# Patient Record
Sex: MALE | Race: WHITE | NOT HISPANIC OR LATINO
[De-identification: names, ages, dates, MRNs, and addresses within clinical notes are randomized per-mention and may not be internally consistent; named-entity substitution may affect disease eponyms.]

---

## 2019-04-11 ENCOUNTER — RX RENEWAL (OUTPATIENT)
Age: 59
End: 2019-04-11

## 2019-04-11 PROBLEM — Z00.00 ENCOUNTER FOR PREVENTIVE HEALTH EXAMINATION: Status: ACTIVE | Noted: 2019-04-11

## 2019-08-02 ENCOUNTER — APPOINTMENT (OUTPATIENT)
Dept: GASTROENTEROLOGY | Facility: CLINIC | Age: 59
End: 2019-08-02

## 2019-08-12 ENCOUNTER — APPOINTMENT (OUTPATIENT)
Dept: GASTROENTEROLOGY | Facility: CLINIC | Age: 59
End: 2019-08-12
Payer: COMMERCIAL

## 2019-08-12 VITALS
TEMPERATURE: 97.5 F | WEIGHT: 191 LBS | BODY MASS INDEX: 25.87 KG/M2 | SYSTOLIC BLOOD PRESSURE: 115 MMHG | DIASTOLIC BLOOD PRESSURE: 71 MMHG | HEART RATE: 73 BPM | HEIGHT: 72 IN | OXYGEN SATURATION: 96 %

## 2019-08-12 DIAGNOSIS — K46.9 UNSPECIFIED ABDOMINAL HERNIA W/OUT OBSTRUCTION OR GANGRENE: ICD-10-CM

## 2019-08-12 DIAGNOSIS — R45.0 NERVOUSNESS: ICD-10-CM

## 2019-08-12 DIAGNOSIS — F10.21 ALCOHOL DEPENDENCE, IN REMISSION: ICD-10-CM

## 2019-08-12 DIAGNOSIS — Z80.0 FAMILY HISTORY OF MALIGNANT NEOPLASM OF DIGESTIVE ORGANS: ICD-10-CM

## 2019-08-12 DIAGNOSIS — Z78.9 OTHER SPECIFIED HEALTH STATUS: ICD-10-CM

## 2019-08-12 DIAGNOSIS — R63.0 ANOREXIA: ICD-10-CM

## 2019-08-12 DIAGNOSIS — Z80.1 FAMILY HISTORY OF MALIGNANT NEOPLASM OF TRACHEA, BRONCHUS AND LUNG: ICD-10-CM

## 2019-08-12 PROCEDURE — 99213 OFFICE O/P EST LOW 20 MIN: CPT

## 2019-08-12 RX ORDER — TAMSULOSIN HYDROCHLORIDE 0.4 MG/1
0.4 CAPSULE ORAL
Qty: 30 | Refills: 0 | Status: ACTIVE | COMMUNITY
Start: 2019-02-12

## 2019-08-12 RX ORDER — UMECLIDINIUM BROMIDE AND VILANTEROL TRIFENATATE 62.5; 25 UG/1; UG/1
62.5-25 POWDER RESPIRATORY (INHALATION)
Qty: 180 | Refills: 0 | Status: ACTIVE | COMMUNITY
Start: 2019-04-28

## 2019-08-12 RX ORDER — PREDNISONE 10 MG/1
10 TABLET ORAL
Qty: 14 | Refills: 0 | Status: ACTIVE | COMMUNITY
Start: 2019-02-19

## 2019-08-12 RX ORDER — ADALIMUMAB 40MG/0.8ML
40 KIT SUBCUTANEOUS
Qty: 6 | Refills: 0 | Status: ACTIVE | COMMUNITY
Start: 2019-03-28

## 2019-08-12 RX ORDER — ESCITALOPRAM OXALATE 20 MG/1
20 TABLET ORAL
Qty: 90 | Refills: 0 | Status: ACTIVE | COMMUNITY
Start: 2019-07-15

## 2019-08-12 RX ORDER — BROMPHENIRAMINE MALEATE, PSEUDOEPHEDRINE HYDROCHLORIDE, 2; 30; 10 MG/5ML; MG/5ML; MG/5ML
30-2-10 SYRUP ORAL
Qty: 240 | Refills: 0 | Status: ACTIVE | COMMUNITY
Start: 2019-02-19

## 2019-08-12 RX ORDER — POLYETHYLENE GLYCOL 3350, SODIUM SULFATE, SODIUM CHLORIDE, POTASSIUM CHLORIDE, ASCORBIC ACID, SODIUM ASCORBATE 7.5-2.691G
100 KIT ORAL
Qty: 1 | Refills: 0 | Status: ACTIVE | COMMUNITY
Start: 2019-08-12 | End: 1900-01-01

## 2019-08-12 RX ORDER — FENOFIBRIC ACID 135 MG/1
135 CAPSULE, DELAYED RELEASE ORAL
Qty: 90 | Refills: 0 | Status: ACTIVE | COMMUNITY
Start: 2019-04-10

## 2019-08-12 RX ORDER — CLONAZEPAM 0.5 MG/1
0.5 TABLET ORAL
Qty: 30 | Refills: 0 | Status: ACTIVE | COMMUNITY
Start: 2019-06-03

## 2019-08-12 RX ORDER — ESCITALOPRAM OXALATE 10 MG/1
10 TABLET ORAL
Qty: 30 | Refills: 0 | Status: ACTIVE | COMMUNITY
Start: 2019-07-01

## 2019-08-12 RX ORDER — IBUPROFEN 800 MG/1
800 TABLET, FILM COATED ORAL
Qty: 90 | Refills: 0 | Status: ACTIVE | COMMUNITY
Start: 2019-05-10

## 2019-08-12 RX ORDER — AZITHROMYCIN 250 MG/1
250 TABLET, FILM COATED ORAL
Qty: 6 | Refills: 0 | Status: ACTIVE | COMMUNITY
Start: 2019-02-19

## 2019-08-12 NOTE — ASSESSMENT
[FreeTextEntry1] : Weight Loss: The patient complains of weight loss and anorexia.  The patient admits to losing 13 pounds over the past 2 months.  He recently gained back 7 pounds.  He is feeling better from the anxiety secondary to Clonazepam and Lexapro but cannot exclude a GI malignancy.   I recommend an upper endoscopy and colonoscopy to assess the symptoms and assess for occult GI malignancy.  The patient was told of the risks and benefits of the procedure.  The patient was told of the risks of perforation, emergency surgery, bleeding, infections and missed lesions.  The patient agreed and will schedule for the procedure.  The patient is to be n.p.o. after midnight and bowel prep was given.  The patient is to return for the procedure. \par Hiatal Hernia:  The patient was advised to avoid late-night meals and dietary indiscretions.  The patient was advised to avoid fried and fatty foods.  The patient was advised to abide by an anti-GERD diet. The patient was given a pamphlet for anti-GERD.  The patient and I reviewed the anti-GERD diet at length. I recommend a trial of Pantoprazole 40 mg once a day for the Torres's esophagus.\par Gastritis: The patient has a history of gastritis. The patient is to avoid nonsteroidal anti-inflammatory drugs and aspirin. I recommend a trial of pantoprazole 40 mg once a day for the symptoms and Torres's esophagus. \par Torres`s Esophagus: The patient is diagnosed with Torres`s Esophagus. The patient has a history of Torres`s esophagus on prior upper endoscopy.  The patient and I had a long discussion regarding the risk of Torres`s esophagus progressing to esophageal cancer.  The patient was told of the importance of acid suppression. The patient was told of the importance of follow-up for repeat endoscopies to assess for dysplasia. The patient agrees and will follow up. I recommend a trial of Pantoprazole 40mg once a day for the Torres`s esophagus. I recommend a repeat upper endoscopy to reassess for Torres’s esophagus and dysplasia.  The patient agreed and will follow up for the procedure.\par Gastric AVMs: The patient had a prior upper endoscopy that revealed nonbleeding gastric AVMs.  The gastric AVMs were not cauterized at the time. The patient denies any bright red blood per rectum, melena or hematemesis.  I will try to obtain the blood work to assess for anemia.  The patient is currently guaiac-negative on physical exam. If bleeding occurs, the patient may require ablation of a gastric AVM`s. \par Diverticulosis: I recommend a high-fiber diet and avoid seeds. The patient is to consider a trial of Metamucil once a day for fiber supplementation. The patient is to also consider a trial of a probiotic such as Align once a day. \par Internal Hemorrhoids: The patient is to be started on a trial of Anusol H. C. suppositories one per rectum nightly and Anusol HC2 .5% cream apply to affected area twice a day p.r.n. hemorrhoidal bleeding or pain. \par History of Colonic Polyps: The patient has a history of colonic polyps.  I recommend a repeat colonoscopy to reassess for colonic polyps.  The patient agreed and will follow up for the procedure.\par Family History of Colonic Polyps: The patient has a history of colonic polyps.  I recommend a repeat colonoscopy  to reassess for colonic polyps.  The patient agreed and will follow up for the procedure. \par Family History of Colon Cancer: The patient has a family history of colon cancer.  I recommend a repeat colonoscopy  to reassess for colonic polyps.  The patient agreed and will follow up for the procedure. \par Upper Endoscopy and Colonoscopy: I recommend an upper endoscopy and colonoscopy to assess the symptoms and assess for occult GI malignancy.  The patient was told of the risks and benefits of the procedure.  The patient was told of the risks of perforation, emergency surgery, bleeding, infections and missed lesions.  The patient agreed and will schedule for the procedure.  The patient is to be n.p.o. after midnight and bowel prep was given.  The patient is to return for the procedure. \par Follow-up: The patient is to follow-up in the office in 4 weeks to reassess the symptoms. The patient was told to call the office if any further problems.\par \par \par

## 2019-08-12 NOTE — HISTORY OF PRESENT ILLNESS
[None] : had no significant interval events [Heartburn] : denies heartburn [Nausea] : denies nausea [Vomiting] : denies vomiting [Diarrhea] : denies diarrhea [Constipation] : denies constipation [Yellow Skin Or Eyes (Jaundice)] : denies jaundice [Abdominal Pain] : denies abdominal pain [Abdominal Swelling] : denies abdominal swelling [Rectal Pain] : denies rectal pain [Wt Gain ___ Lbs] : no recent weight gain [Wt Loss ___ Lbs] : recent [unfilled] ~Upound(s) weight loss [GERD] : no gastroesophageal reflux disease [Hiatus Hernia] : hiatus hernia [Peptic Ulcer Disease] : no peptic ulcer disease [Pancreatitis] : no pancreatitis [Cholelithiasis] : cholelithiasis [Kidney Stone] : no kidney stone [Inflammatory Bowel Disease] : no inflammatory bowel disease [Irritable Bowel Syndrome] : no irritable bowel syndrome [Diverticulitis] : no diverticulitis [Alcohol Abuse] : no alcohol abuse [Malignancy] : no malignancy [Abdominal Surgery] : no abdominal surgery [Appendectomy] : appendectomy [Cholecystectomy] : cholecystectomy [de-identified] : The patient denies any prior exposure to hepatitis A, B or C.  The patient denies any large doses of nonsteroidal anti-inflammatory drugs or acetaminophen.   The patient denies sharing needles, razors, nail clippers, nail files, scissors, et cetera.  The patient admits to EtOH abuse but denies any recent ETOH use, cocaine use and intravenous drug use.  The patient denies any prior blood transfusions, sexual indiscretions, tattoos or piercing.  The patient admits to having prior surgery.  The history of surgery is significant for a prior cholecystectomy, vasectomy, testicle removal, hernia repair, appendectomy and undescended testicle.  The patient admits to a family history of GI and liver problems.  The patent’s mother had a history of diverticulitis and paternal grandmother with a history of colon cancer.  The patient states that he is feeling anxious.  The patient is being followed by his PMD, Dr. Keller.  The patient recently retired from work.   The patient lost 13 pounds over 2 months secondary to anxiety and loss of appetite.  The patient had a recent CAT scan of the chest to assess lung nodule but the results are pending.  The patient denies any jaundice or pruritus.  The patient denies any chronic lower back pain. The patient denies any abdominal pain.  The patient denies any abdominal gas and bloating.  The patient denies any nausea or vomiting.  The patient denies any gastroesophageal reflux disease or dysphagia.  The patient denies any atypical chest pain, shortness of breath or palpitations.  The patient admits to occasional episodes of diaphoresis.  .  The patient denies any constipation or diarrhea.  The patient has 1 to 2 bowel movements a day.   The patient denies a change in bowel habits.  The patient denies a change in caliber of stool. The patient denies having mucus discharge with the bowel movements.  The patient denies any bright red blood per rectum, melena or hematemesis.  The patient denies any rectal pain or rectal pruritus.  The patient complains of weight loss and anorexia.  The patient admits to losing 13 pounds over the past 2 months.  He recently gained back 7 pounds.  He is feeling better from the anxiety secondary to Clonazepam and Lexapro.  He denies any fevers or chills.  The patient is being followed by his pulmonologist, Dr. Savage. The CAT scan of the chest performed on August 9, 2019 is pending.  The upper endoscopy performed at the office on June 22, 2018 revealed Torres esophagus, a hiatal hernia, a small nonbleeding gastric AVM in the hiatal hernia sac and moderate diffuse bile gastritis.  The pathology revealed distal esophagus with squamous mucosa with mild gastroesophageal reflux disease with no evidence of eosinophilic esophagitis or fungal microorganisms, Torres's mucosa with chronic inflammation with no evidence of epithelial dysplasia that was negative for Helicobacter pylori, gastric antral mucosa with chronic inactive gastritis with no evidence of intestinal metaplasia or dysplasia that was negative for Helicobacter pylori, gastric body mucosa with chronic inactive gastritis with no evidence of intestinal metaplasia or dysplasia that was negative for Helicobacter pylori and duodenal mucosa with normal villous architecture with no evidence of intraepithelial lymphocytosis, celiac disease, Giardia or parasites. The colonoscopy to the terminal ileum performed at the office on November 10, 2017 revealed a rectal sigmoid colon polypoid lesion versus inverted diverticula versus pseudopolyp, moderate left-sided diverticulosis and small internal hemorrhoids. There were no other polyps, masses, AVMs or colitis noted. Random biopsies were taken of the rectosigmoid colon polypoid lesion versus an everted diverticula versus pseudopolyp (30 cm) to assess for microscopic colitis. The pathology revealed fragments of almost completely denuded polypoid granulation tissue covered with fibrino-purulent exudate and fragments of colonic mucosa with focal hyperplasia and changes suggestive of inflammatory bowel formation with no dysplasia, granulomas or viral inclusions identified. The findings favor diverticulosis associated changes and inflammatory polyp formation. The patient tolerated the procedures well. The patient was previously evaluated by his PMD for abdominal pain in March 2018 and treated with antibiotics for presumed acute diverticulitis with improvement of his symptoms.  The patient had been on Humira every 2 weeks for psoriasis. The CAT scan of the abdomen and pelvis with IV contrast performed on June 26, 2013 revealed extensive colonic diverticulosis, minimal haziness in the peritoneal fat adjacent to the distal and mid descending colon that might indicate minimal diverticulitis.  Also noted were a few small calcifications in the inferior pancreatic head consistent with old/chronic pancreatitis. The patient admits to a prior history of EtOH abuse. The patient admits to being abstinent of alcohol for 9 years. The patient admits to a family history of colon cancer and diverticulitis.

## 2019-08-13 LAB — HEMOCCULT STL QL: NEGATIVE

## 2019-09-13 ENCOUNTER — RX RENEWAL (OUTPATIENT)
Age: 59
End: 2019-09-13

## 2019-09-13 RX ORDER — PANTOPRAZOLE 40 MG/1
40 TABLET, DELAYED RELEASE ORAL DAILY
Qty: 30 | Refills: 3 | Status: ACTIVE | COMMUNITY
Start: 2019-04-11 | End: 1900-01-01

## 2019-09-16 ENCOUNTER — RX RENEWAL (OUTPATIENT)
Age: 59
End: 2019-09-16

## 2019-09-16 RX ORDER — PANTOPRAZOLE 40 MG/1
40 TABLET, DELAYED RELEASE ORAL DAILY
Qty: 30 | Refills: 3 | Status: ACTIVE | COMMUNITY
Start: 2019-09-16 | End: 1900-01-01

## 2019-09-24 ENCOUNTER — APPOINTMENT (OUTPATIENT)
Dept: GASTROENTEROLOGY | Facility: HOSPITAL | Age: 59
End: 2019-09-24

## 2019-09-24 ENCOUNTER — OUTPATIENT (OUTPATIENT)
Dept: OUTPATIENT SERVICES | Facility: HOSPITAL | Age: 59
LOS: 1 days | End: 2019-09-24
Payer: COMMERCIAL

## 2019-09-24 DIAGNOSIS — K57.30 DIVERTICULOSIS OF LARGE INTESTINE WITHOUT PERFORATION OR ABSCESS WITHOUT BLEEDING: ICD-10-CM

## 2019-09-24 PROCEDURE — 45378 DIAGNOSTIC COLONOSCOPY: CPT

## 2019-09-24 PROCEDURE — G0105: CPT

## 2019-11-15 ENCOUNTER — APPOINTMENT (OUTPATIENT)
Dept: GASTROENTEROLOGY | Facility: CLINIC | Age: 59
End: 2019-11-15
Payer: COMMERCIAL

## 2019-11-15 VITALS
TEMPERATURE: 97.7 F | SYSTOLIC BLOOD PRESSURE: 105 MMHG | DIASTOLIC BLOOD PRESSURE: 71 MMHG | WEIGHT: 183 LBS | HEART RATE: 72 BPM | HEIGHT: 72 IN | BODY MASS INDEX: 24.79 KG/M2 | OXYGEN SATURATION: 94 %

## 2019-11-15 DIAGNOSIS — K57.30 DIVERTICULOSIS OF LARGE INTESTINE W/OUT PERFORATION OR ABSCESS W/OUT BLEEDING: ICD-10-CM

## 2019-11-15 PROCEDURE — 99213 OFFICE O/P EST LOW 20 MIN: CPT

## 2019-11-15 RX ORDER — PANTOPRAZOLE 40 MG/1
40 TABLET, DELAYED RELEASE ORAL
Qty: 90 | Refills: 3 | Status: ACTIVE | COMMUNITY
Start: 2019-11-15 | End: 1900-01-01

## 2019-11-15 RX ORDER — ALBUTEROL SULFATE 90 UG/1
108 (90 BASE) INHALANT RESPIRATORY (INHALATION)
Qty: 8 | Refills: 0 | Status: ACTIVE | COMMUNITY
Start: 2019-10-24

## 2019-11-15 NOTE — ASSESSMENT
[FreeTextEntry1] : Weight Loss: The patient previously complained of weight loss and anorexia. The patient admitted to losing 13 pounds over  2 months. The weight has stabilized with no further weight loss. The anxiety improved on Clonazepam and Lexapro. I recommend an upper endoscopy  to assess the symptoms and assess for occult GI malignancy. The patient was told of the risks and benefits of the procedure. The patient was told of the risks of perforation, emergency surgery, bleeding, infections and missed lesions. The patient agreed and will schedule for the procedure. The patient is to be n.p.o. after midnight . The patient is to return for the procedure. \par Hiatal Hernia: The patient was advised to avoid late-night meals and dietary indiscretions. The patient was advised to avoid fried and fatty foods. The patient was advised to abide by an anti-GERD diet. The patient was given a pamphlet for anti-GERD. The patient and I reviewed the anti-GERD diet at length. I recommend a trial of Pantoprazole 40 mg once a day for the Torres's esophagus.\par Gastritis: The patient has a history of gastritis. The patient is to avoid nonsteroidal anti-inflammatory drugs and aspirin. I recommend a trial of pantoprazole 40 mg once a day for the symptoms and Torres's esophagus. \par Torres’s Esophagus: The patient is diagnosed with Torres’s Esophagus. The patient has a history of Torres’s esophagus on prior upper endoscopy. The patient and I had a long discussion regarding the risk of Torres’s esophagus progressing to esophageal cancer. The patient was told of the importance of acid suppression. The patient was told of the importance of follow-up for repeat endoscopies to assess for dysplasia. The patient agrees and will follow up. I recommend a trial of Pantoprazole 40mg once a day for the Torres’s esophagus. I recommend a repeat upper endoscopy to reassess for Torres’s esophagus and dysplasia. The patient agreed and will follow up for the procedure.\par Gastric AVMs: The patient had a prior upper endoscopy that revealed nonbleeding gastric AVMs. The gastric AVMs were not cauterized at the time. The patient denies any bright red blood per rectum, melena or hematemesis. I will try to obtain the blood work to assess for anemia. The patient is currently guaiac-negative on physical exam. If bleeding occurs, the patient may require ablation of a gastric AVM\par Diverticulosis: I recommend a high-fiber diet and avoid seeds. The patient is to consider a trial of Metamucil once a day for fiber supplementation. The patient is to also consider a trial of a probiotic such as Align once a day. \par Internal Hemorrhoids: The patient is to be started on a trial of Anusol H. C. suppositories one per rectum nightly and Anusol HC2.5% cream apply to affected area twice a day p.r.n. hemorrhoidal bleeding or pain. \par History of Colonic Polyps: The patient has a history of colonic polyps. I recommend a repeat colonoscopy to reassess for colonic polyps. The patient agreed and will follow up for the procedure.\par Family History of Colonic Polyps: The patient has a history of colonic polyps. I recommend a repeat colonoscopy in 5 years to reassess for colonic polyps unless symptomatic. The patient agreed and will follow up for the procedure. \par Family History of Colon Cancer: The patient has a family history of colon cancer. I recommend a repeat colonoscopy in 5 years to reassess for colonic polyps unless symptomatic. The patient agreed and will follow up for the procedure. \par Follow-up: The patient is to follow-up in the office in 6 months to reassess the symptoms. The patient was told to call the office if any further problems.\par

## 2019-11-15 NOTE — HISTORY OF PRESENT ILLNESS
[None] : had no significant interval events [Nausea] : denies nausea [Heartburn] : denies heartburn [Vomiting] : denies vomiting [Diarrhea] : denies diarrhea [Constipation] : denies constipation [Yellow Skin Or Eyes (Jaundice)] : denies jaundice [Abdominal Pain] : denies abdominal pain [Abdominal Swelling] : denies abdominal swelling [Rectal Pain] : denies rectal pain [Cholelithiasis] : cholelithiasis [Appendectomy] : appendectomy [Cholecystectomy] : cholecystectomy [Wt Gain ___ Lbs] : no recent weight gain [Wt Loss ___ Lbs] : no recent weight loss [GERD] : no gastroesophageal reflux disease [Hiatus Hernia] : no hiatus hernia [Peptic Ulcer Disease] : no peptic ulcer disease [Pancreatitis] : no pancreatitis [Kidney Stone] : no kidney stone [Inflammatory Bowel Disease] : no inflammatory bowel disease [Irritable Bowel Syndrome] : no irritable bowel syndrome [Alcohol Abuse] : no alcohol abuse [Diverticulitis] : no diverticulitis [Malignancy] : no malignancy [de-identified] : The patient denies any prior exposure to hepatitis A, B or C. The patient denies any large doses of nonsteroidal anti-inflammatory drugs or acetaminophen. The patient denies sharing needles, razors, nail clippers, nail files, scissors, et cetera. The patient admits to EtOH abuse but denies any recent ETOH use, cocaine use and intravenous drug use. The patient denies any prior blood transfusions, sexual indiscretions, tattoos or piercing. The patient admits to having prior surgery. The history of surgery is significant for a prior cholecystectomy, vasectomy, testicle removal, hernia repair, appendectomy and undescended testicle. The patient admits to a family history of GI and liver problems. The patent’s mother had a history of diverticulitis and paternal grandmother with a history of colon cancer. The patient states that he is feeling fine. The patient denies any jaundice or pruritus.  The patient denies any chronic lower back pain. The patient denies any abdominal pain.  The patient denies any abdominal gas and bloating.  The patient denies any nausea or vomiting.  The patient denies any gastroesophageal reflux disease or dysphagia.  The patient denies any atypical chest pain, shortness of breath or palpitations. The patient admits to occasional episodes of diaphoresis.  The patient denies any constipation or diarrhea.  The patient has 2 bowel movements a day.  The patient denies a change in bowel habits.  The patient denies a change in caliber of stool.  The patient denies having mucus discharge with the bowel movements.  The patient denies any bright red blood per rectum, melena or hematemesis.  The patient denies any rectal pain or rectal pruritus.  The patient denies any weight loss or anorexia.  He denies any fevers or chills.  The patient had a colonoscopy to the terminal ileum performed at the [facility] endoscopy suite on September 24, 2019. The colonoscopy to the terminal ileum revealed moderate pandiverticulosis that was primarily concentrated to the left colon and small internal hemorrhoids.  There were no polyps, masses, AVMs or colitis noted.  The upper endoscopy performed at the office on June 22, 2018 revealed Torres esophagus, a hiatal hernia, a small nonbleeding gastric AVM in the hiatal hernia sac and moderate diffuse bile gastritis. The pathology revealed distal esophagus with squamous mucosa with mild gastroesophageal reflux disease with no evidence of eosinophilic esophagitis or fungal microorganisms, Torres's mucosa with chronic inflammation with no evidence of epithelial dysplasia that was negative for Helicobacter pylori, gastric antral mucosa with chronic inactive gastritis with no evidence of intestinal metaplasia or dysplasia that was negative for Helicobacter pylori, gastric body mucosa with chronic inactive gastritis with no evidence of intestinal metaplasia or dysplasia that was negative for Helicobacter pylori and duodenal mucosa with normal villous architecture with no evidence of intraepithelial lymphocytosis, celiac disease, Giardia or parasites.  The patient tolerated the procedures well. The patient is being followed by his pulmonologist, Dr. Savage. The CAT scan of the chest performed on August 9, 2019 is pending. The patient was previously evaluated by his PMD for abdominal pain in March 2018 and treated with antibiotics for presumed acute diverticulitis with improvement of his symptoms. The patient had been on Humira every 2 weeks for psoriasis. The CAT scan of the abdomen and pelvis with IV contrast performed on June 26, 2013 revealed extensive colonic diverticulosis, minimal haziness in the peritoneal fat adjacent to the distal and mid descending colon that might indicate minimal diverticulitis. Also noted were a few small calcifications in the inferior pancreatic head consistent with old/chronic pancreatitis. The patient admits to a prior history of EtOH abuse. The patient admits to being abstinent of alcohol for 9 years. The patient admits to a family history of colon cancer and diverticulitis.  [Abdominal Surgery] : no abdominal surgery

## 2020-01-09 ENCOUNTER — RESULT REVIEW (OUTPATIENT)
Age: 60
End: 2020-01-09

## 2020-01-09 ENCOUNTER — APPOINTMENT (OUTPATIENT)
Dept: GASTROENTEROLOGY | Facility: HOSPITAL | Age: 60
End: 2020-01-09

## 2020-01-09 ENCOUNTER — OUTPATIENT (OUTPATIENT)
Dept: OUTPATIENT SERVICES | Facility: HOSPITAL | Age: 60
LOS: 1 days | End: 2020-01-09
Payer: COMMERCIAL

## 2020-01-09 DIAGNOSIS — K29.30 CHRONIC SUPERFICIAL GASTRITIS WITHOUT BLEEDING: ICD-10-CM

## 2020-01-09 PROCEDURE — 88312 SPECIAL STAINS GROUP 1: CPT | Mod: 26

## 2020-01-09 PROCEDURE — 88305 TISSUE EXAM BY PATHOLOGIST: CPT | Mod: 26

## 2020-01-09 PROCEDURE — 43239 EGD BIOPSY SINGLE/MULTIPLE: CPT

## 2020-01-09 PROCEDURE — 88312 SPECIAL STAINS GROUP 1: CPT

## 2020-01-09 PROCEDURE — 88305 TISSUE EXAM BY PATHOLOGIST: CPT

## 2020-01-13 LAB — SURGICAL PATHOLOGY STUDY: SIGNIFICANT CHANGE UP

## 2020-02-28 ENCOUNTER — APPOINTMENT (OUTPATIENT)
Dept: GASTROENTEROLOGY | Facility: CLINIC | Age: 60
End: 2020-02-28
Payer: COMMERCIAL

## 2020-02-28 VITALS
SYSTOLIC BLOOD PRESSURE: 125 MMHG | OXYGEN SATURATION: 96 % | TEMPERATURE: 97.6 F | HEIGHT: 72 IN | WEIGHT: 179 LBS | DIASTOLIC BLOOD PRESSURE: 74 MMHG | BODY MASS INDEX: 24.24 KG/M2 | HEART RATE: 88 BPM

## 2020-02-28 PROCEDURE — 99213 OFFICE O/P EST LOW 20 MIN: CPT

## 2020-02-28 RX ORDER — LEVOFLOXACIN 500 MG/1
500 TABLET, FILM COATED ORAL
Qty: 7 | Refills: 0 | Status: ACTIVE | COMMUNITY
Start: 2019-11-26

## 2020-02-28 RX ORDER — PANTOPRAZOLE 40 MG/1
40 TABLET, DELAYED RELEASE ORAL
Qty: 90 | Refills: 3 | Status: ACTIVE | COMMUNITY
Start: 2020-02-28 | End: 1900-01-01

## 2020-02-28 NOTE — ASSESSMENT
[FreeTextEntry1] : Weight Loss: The patient previously complained of weight loss and anorexia. The patient admitted to losing 13 pounds over 2 months. The weight has stabilized with no further weight loss. The anxiety improved on Clonazepam and Lexapro. \par Hiatal Hernia: The patient was advised to avoid late-night meals and dietary indiscretions. The patient was advised to avoid fried and fatty foods. The patient was advised to abide by an anti-GERD diet. The patient was given a pamphlet for anti-GERD. The patient and I reviewed the anti-GERD diet at length. I recommend a trial of Pantoprazole 40 mg once a day for the Torres's esophagus.\par Gastritis: The patient has a history of gastritis. The patient is to avoid nonsteroidal anti-inflammatory drugs and aspirin. I recommend a trial of pantoprazole 40 mg once a day for the symptoms and Torres's esophagus. \par Torres’s Esophagus: The patient has a history of Torres’s esophagus on recent upper endoscopy. The patient and I had a long discussion regarding the risk of Torres’s esophagus progressing to esophageal cancer. The patient was told of the importance of acid suppression. The patient was told of the importance of follow-up for repeat endoscopies to assess for dysplasia. The patient agrees and will follow up. I recommend a trial of Pantoprazole 40mg once a day for the Torres’s esophagus. I recommend a repeat upper endoscopy in 3 years to reassess for Torres’s esophagus and dysplasia unless symptomatic. The patient agreed and will follow up for the procedure.\par Gastric AVMs: The patient had a prior upper endoscopy that revealed nonbleeding gastric AVMs. The gastric AVMs were not cauterized at the time. The patient denies any bright red blood per rectum, melena or hematemesis. I will try to obtain the blood work to assess for anemia. The patient is currently guaiac-negative on physical exam. If bleeding occurs, the patient may require ablation of a gastric AVM\par Diverticulosis: I recommend a high-fiber diet and avoid seeds. The patient is to consider a trial of Metamucil once a day for fiber supplementation. The patient is to also consider a trial of a probiotic such as Align once a day. \par Internal Hemorrhoids: The patient is to be started on a trial of Anusol H. C. suppositories one per rectum nightly and Anusol HC2.5% cream apply to affected area twice a day p.r.n. hemorrhoidal bleeding or pain. \par History of Colonic Polyps: The patient has a history of colonic polyps. I recommend a repeat colonoscopy in 5 years to reassess for colonic polyps unless symptomatic. The patient agreed and will follow up for the procedure.\par Family History of Colonic Polyps: The patient has a history of colonic polyps. I recommend a repeat colonoscopy in 5 years to reassess for colonic polyps unless symptomatic. The patient agreed and will follow up for the procedure. \par Family History of Colon Cancer: The patient has a family history of colon cancer. I recommend a repeat colonoscopy in 5 years to reassess for colonic polyps unless symptomatic. The patient agreed and will follow up for the procedure. \par Follow-up: The patient is to follow-up in the office in 6 months to reassess the symptoms. The patient was told to call the office if any further problems.\par

## 2020-02-28 NOTE — HISTORY OF PRESENT ILLNESS
[None] : had no significant interval events [Heartburn] : denies heartburn [Nausea] : denies nausea [Vomiting] : denies vomiting [Diarrhea] : denies diarrhea [Constipation] : denies constipation [Abdominal Pain] : denies abdominal pain [Abdominal Swelling] : denies abdominal swelling [Yellow Skin Or Eyes (Jaundice)] : denies jaundice [Rectal Pain] : denies rectal pain [Hiatus Hernia] : hiatus hernia [Cholelithiasis] : cholelithiasis [Appendectomy] : appendectomy [Cholecystectomy] : cholecystectomy [Wt Gain ___ Lbs] : no recent weight gain [GERD] : no gastroesophageal reflux disease [Wt Loss ___ Lbs] : no recent weight loss [Peptic Ulcer Disease] : no peptic ulcer disease [Pancreatitis] : no pancreatitis [Inflammatory Bowel Disease] : no inflammatory bowel disease [Kidney Stone] : no kidney stone [Irritable Bowel Syndrome] : no irritable bowel syndrome [Alcohol Abuse] : no alcohol abuse [Diverticulitis] : no diverticulitis [Malignancy] : no malignancy [Abdominal Surgery] : no abdominal surgery [de-identified] : The patient denies any prior exposure to hepatitis A, B or C. The patient denies any large doses of nonsteroidal anti-inflammatory drugs or acetaminophen. The patient denies sharing needles, razors, nail clippers, nail files, scissors, et cetera. The patient admits to EtOH abuse but denies any recent ETOH use, cocaine use and intravenous drug use. The patient denies any prior blood transfusions, sexual indiscretions, tattoos or piercing. The patient admits to having prior surgery. The history of surgery is significant for a prior cholecystectomy, vasectomy, testicle removal, hernia repair, appendectomy and undescended testicle. The patient admits to a family history of GI and liver problems. The patent’s mother had a history of diverticulitis and paternal grandmother with a history of colon cancer.  The patient states that he is feeling fine. The patient denies any jaundice or pruritus.  The patient denies any chronic lower back pain. The patient denies any abdominal pain.  The patient denies any abdominal gas and bloating.  The patient denies any nausea or vomiting.  The patient denies any gastroesophageal reflux disease or dysphagia.  The patient denies any atypical chest pain, shortness of breath or palpitations.  The patient denies any diaphoresis. The patient denies any constipation or diarrhea.  The patient has 1 to 2 bowel movements a day.  The patient denies a change in bowel habits.  The patient denies a change in caliber of stool.  The patient denies having mucus discharge with the bowel movements.  The patient denies any bright red blood per rectum, melena or hematemesis. The patient denies any rectal pain or rectal pruritus.  The patient currently denies any weight loss or anorexia.  He denies any fevers or chills.  The patient had an upper endoscopy performed at the [facility] [place] GI endoscopy suite on January 9, 2020. The upper endoscopy was performed up to the level of the second portion of the duodenum. The upper endoscopy revealed Torres's esophagus, a large hiatal hernia and mild diffuse bile gastritis. Biopsies were taken of the distal esophagus, antrum, body of stomach and duodenum to assess for esophagitis, gastritis and duodenitis. The pathology revealed distal esophagus with squamo-glandular junction composed of unremarkable squamous epithelium and inflamed fragments of cardiac-type mucosa with foci of intestinal metaplasia with no dysplasia identified, gastric antral and body mucosa with chronic inactive gastritis that was negative for Helicobacter pylori and unremarkable small intestinal duodenal mucosa with preserved villous architecture with no evidence of increased intraepithelial lymphocytes, celiac disease, or parasites.   The patient had a colonoscopy to the terminal ileum performed at the [facility] [place] GI endoscopy suite on September 24, 2019. The colonoscopy to the terminal ileum revealed moderate pandiverticulosis that was primarily concentrated to the left colon and small internal hemorrhoids. There were no polyps, masses, AVMs or colitis noted. The patient tolerated the procedures well. The patient is being followed by his pulmonologist, Dr. Savage. The CAT scan of the chest performed on August 9, 2019 is pending. The patient was previously evaluated by his PMD for abdominal pain in March 2018 and treated with antibiotics for presumed acute diverticulitis with improvement of his symptoms. The patient had been on Humira every 2 weeks for psoriasis. The CAT scan of the abdomen and pelvis with IV contrast performed on June 26, 2013 revealed extensive colonic diverticulosis, minimal haziness in the peritoneal fat adjacent to the distal and mid descending colon that might indicate minimal diverticulitis. Also noted were a few small calcifications in the inferior pancreatic head consistent with old/chronic pancreatitis. The patient admits to a prior history of EtOH abuse. The patient admits to being abstinent of alcohol for 9 years. The patient admits to a family history of colon cancer and diverticulitis.

## 2020-09-11 ENCOUNTER — APPOINTMENT (OUTPATIENT)
Dept: GASTROENTEROLOGY | Facility: CLINIC | Age: 60
End: 2020-09-11
Payer: COMMERCIAL

## 2020-09-11 VITALS
OXYGEN SATURATION: 98 % | DIASTOLIC BLOOD PRESSURE: 73 MMHG | SYSTOLIC BLOOD PRESSURE: 117 MMHG | HEIGHT: 72 IN | BODY MASS INDEX: 24.38 KG/M2 | WEIGHT: 180 LBS | HEART RATE: 89 BPM

## 2020-09-11 DIAGNOSIS — K62.5 HEMORRHAGE OF ANUS AND RECTUM: ICD-10-CM

## 2020-09-11 PROCEDURE — 99213 OFFICE O/P EST LOW 20 MIN: CPT

## 2020-09-11 NOTE — ASSESSMENT
[FreeTextEntry1] : Dyspepsia: The patient complains of dyspeptic symptoms.  The patient was advised to abide by an anti-gas (low FOD-MAP) diet.  The patient was given a pamphlet for anti-gas.  The patient and I reviewed the anti-gas diet at length. The patient is to start on a trial of Phazyme one tablet 3 times a day p.r.n. abdominal pain and gas.\par Rectal Bleeding: The patient had episodes of rectal bleeding.  The etiology of the rectal bleeding is unclear.  I recommend a trial of Anusol HC suppositories one per rectum QHS and Anusol HC 2.5% cream apply to affected area twice a day PRN hemorrhoidal bleeding or pain.  I recommend a trial of Calmoseptine cream apply to affected area twice a day for rectal discomfort. I recommend Tucks pads for the hemorrhoids.  I recommend starting Sitz baths twice a day for the hemorrhoids.  I recommend avoid wearing tight underwear and use boxers. I recommend avoid touching the perineal area. If the symptoms persist, I recommend a surgical evaluation for possible band ligation of the hemorrhoids with Dr. Davis Hua for possible surgery.  If the symptoms persist, the patient may require a colonoscopy to assess the site of bleeding. The patient was told of the risks and benefits of the procedure.  The patient was told of the risks of perforation, emergency surgery, bleeding, infections and missed lesions. The patient agrees and will follow up to assess the symptoms.\par History of Weight Loss: The patient previously complained of weight loss and anorexia. The patient admitted to losing 13 pounds over 2 months. The weight has stabilized with no further weight loss. The anxiety improved on Clonazepam and Lexapro. \par Hiatal Hernia: The patient was advised to avoid late-night meals and dietary indiscretions. The patient was advised to avoid fried and fatty foods. The patient was advised to abide by an anti-GERD diet. The patient was given a pamphlet for anti-GERD. The patient and I reviewed the anti-GERD diet at length. I recommend a trial of Pantoprazole 40 mg once a day for the Torres's esophagus.\par Gastritis: The patient has a history of gastritis. The patient is to avoid nonsteroidal anti-inflammatory drugs and aspirin. I recommend a trial of pantoprazole 40 mg once a day for the symptoms and Torres's esophagus. \par Torres’s Esophagus: The patient has a history of Torres’s esophagus on recent upper endoscopy. The patient and I had a long discussion regarding the risk of Torres’s esophagus progressing to esophageal cancer. The patient was told of the importance of acid suppression. The patient was told of the importance of follow-up for repeat endoscopies to assess for dysplasia. The patient agrees and will follow up. I recommend a trial of Pantoprazole 40mg once a day for the Torres’s esophagus. I recommend a repeat upper endoscopy in 2 years to reassess for Torres’s esophagus and dysplasia unless symptomatic. The patient agreed and will follow up for the procedure.\par Gastric AVMs: The patient had a prior upper endoscopy that revealed nonbleeding gastric AVMs. The gastric AVMs were not cauterized at the time. The patient denies any bright red blood per rectum, melena or hematemesis. I will try to obtain the blood work to assess for anemia. The patient is currently guaiac-negative on physical exam. If bleeding occurs, the patient may require ablation of a gastric AVM\par Diverticulosis: I recommend a high-fiber diet and avoid seeds. The patient is to consider a trial of Metamucil once a day for fiber supplementation. The patient is to also consider a trial of a probiotic such as Align once a day. \par Internal Hemorrhoids: The patient is to be started on a trial of Anusol H. C. suppositories one per rectum nightly and Anusol HC2.5% cream apply to affected area twice a day p.r.n. hemorrhoidal bleeding or pain. \par History of Colonic Polyps: The patient has a history of colonic polyps. I recommend a repeat colonoscopy in 5 years to reassess for colonic polyps unless symptomatic. The patient agreed and will follow up for the procedure.\par Family History of Colonic Polyps: The patient has a history of colonic polyps. I recommend a repeat colonoscopy in 4 years to reassess for colonic polyps unless symptomatic. The patient agreed and will follow up for the procedure. \par Family History of Colon Cancer: The patient has a family history of colon cancer. I recommend a repeat colonoscopy in 4 years to reassess for colonic polyps unless symptomatic. The patient agreed and will follow up for the procedure. \par Follow-up: The patient is to follow-up in the office in 6 months to reassess the symptoms. The patient was told to call the office if any further problems.\par \par

## 2020-09-11 NOTE — HISTORY OF PRESENT ILLNESS
[None] : had no significant interval events [Heartburn] : resolved heartburn [Nausea] : denies nausea [Vomiting] : denies vomiting [Diarrhea] : resolved diarrhea [Constipation] : denies constipation [Yellow Skin Or Eyes (Jaundice)] : denies jaundice [Rectal Pain] : denies rectal pain [Abdominal Pain] : denies abdominal pain [Abdominal Swelling] : abdominal swelling [Hiatus Hernia] : hiatus hernia [Cholelithiasis] : cholelithiasis [Appendectomy] : appendectomy [Cholecystectomy] : cholecystectomy [Wt Gain ___ Lbs] : no recent weight gain [Wt Loss ___ Lbs] : no recent weight loss [GERD] : no gastroesophageal reflux disease [Peptic Ulcer Disease] : no peptic ulcer disease [Pancreatitis] : no pancreatitis [Kidney Stone] : no kidney stone [Inflammatory Bowel Disease] : no inflammatory bowel disease [Irritable Bowel Syndrome] : no irritable bowel syndrome [Diverticulitis] : no diverticulitis [Alcohol Abuse] : no alcohol abuse [Malignancy] : no malignancy [Abdominal Surgery] : no abdominal surgery [de-identified] : The patient denies any prior exposure to hepatitis A, B or C. The patient denies any large doses of nonsteroidal anti-inflammatory drugs or acetaminophen. The patient denies sharing needles, razors, nail clippers, nail files, scissors, et cetera. The patient admits to EtOH abuse but denies any recent ETOH use, cocaine use and intravenous drug use. The patient denies any prior blood transfusions, sexual indiscretions, tattoos or piercing. The patient admits to having prior surgery. The history of surgery is significant for a prior cholecystectomy, vasectomy, testicle removal, hernia repair, appendectomy and undescended testicle. The patient admits to a family history of GI and liver problems. The patent’s mother had a history of diverticulitis and paternal grandmother with a history of colon cancer. The patient is currently being evaluated by new PMD, Dr. Anahi Mishra DO.  The patient states that he is feeling better. The patient denies any jaundice or pruritus.  The patient denies any chronic lower back pain. The patient denies any abdominal pain.  The patient complains of abdominal gas and bloating.  He attributed the symptoms after starting Prolistic (probiotics).  The patient denies any nausea or vomiting.  The patient denies any gastroesophageal reflux disease or dysphagia.  The patient denies any atypical chest pain, shortness of breath or palpitations.  The patient admits to occasional episodes of diaphoresis at night.  The patient denies any constipation or diarrhea.  The patient has 1 to 2 bowel movements a day.  The patient denies a change in bowel habits.  The patient denies a change in caliber of stool.  The patient denies having mucus discharge with the bowel movements.  The patient complains of 1 episode of lower GI bleeding that resolved spontaneously.  He currently denies any bright red blood per rectum, melena or hematemesis.  The patient denies any rectal pain or rectal pruritus.  The patient complains of fluctuating weight but denies any anorexia.  The patient admits to losing 5 pounds over the past 4 weeks/months.  He denies any fevers or chills.  The patient had an upper endoscopy performed at the [facility] [place] GI endoscopy suite on January 9, 2020. The upper endoscopy was performed up to the level of the second portion of the duodenum. The upper endoscopy revealed Torres's esophagus, a large hiatal hernia and mild diffuse bile gastritis. Biopsies were taken of the distal esophagus, antrum, body of stomach and duodenum to assess for esophagitis, gastritis and duodenitis. The pathology revealed distal esophagus with squamo-glandular junction composed of unremarkable squamous epithelium and inflamed fragments of cardiac-type mucosa with foci of intestinal metaplasia with no dysplasia identified, gastric antral and body mucosa with chronic inactive gastritis that was negative for Helicobacter pylori and unremarkable small intestinal duodenal mucosa with preserved villous architecture with no evidence of increased intraepithelial lymphocytes, celiac disease, or parasites. The patient had a colonoscopy to the terminal ileum performed at the [facility] GI endoscopy suite on September 24, 2019. The colonoscopy to the terminal ileum revealed moderate pandiverticulosis that was primarily concentrated to the left colon and small internal hemorrhoids. There were no polyps, masses, AVMs or colitis noted. The patient tolerated the procedures well. The patient is being followed by his pulmonologist, Dr. Savage. The CAT scan of the chest performed on August 9, 2019 is pending. The patient was previously evaluated by his PMD for abdominal pain in March 2018 and treated with antibiotics for presumed acute diverticulitis with improvement of his symptoms. The patient had been on Humira every 2 weeks for psoriasis. The CAT scan of the abdomen and pelvis with IV contrast performed on June 26, 2013 revealed extensive colonic diverticulosis, minimal haziness in the peritoneal fat adjacent to the distal and mid descending colon that might indicate minimal diverticulitis. Also noted were a few small calcifications in the inferior pancreatic head consistent with old/chronic pancreatitis. The patient admits to a prior history of EtOH abuse. The patient admits to being abstinent of alcohol for 9 years. The patient admits to a family history of colon cancer and diverticulitis.  [de-identified] : (+) prior smoking 2PPD x 10 years stopped x 18 years, (-) ETOH stopped x 10 years, (-) IVDA\par

## 2021-04-16 ENCOUNTER — APPOINTMENT (OUTPATIENT)
Dept: GASTROENTEROLOGY | Facility: CLINIC | Age: 61
End: 2021-04-16
Payer: COMMERCIAL

## 2021-04-16 VITALS
DIASTOLIC BLOOD PRESSURE: 82 MMHG | HEIGHT: 72 IN | SYSTOLIC BLOOD PRESSURE: 136 MMHG | OXYGEN SATURATION: 97 % | BODY MASS INDEX: 23.57 KG/M2 | HEART RATE: 104 BPM | TEMPERATURE: 97.2 F | WEIGHT: 174 LBS

## 2021-04-16 DIAGNOSIS — R63.4 ABNORMAL WEIGHT LOSS: ICD-10-CM

## 2021-04-16 PROCEDURE — 99072 ADDL SUPL MATRL&STAF TM PHE: CPT

## 2021-04-16 PROCEDURE — 99214 OFFICE O/P EST MOD 30 MIN: CPT

## 2021-04-16 NOTE — ASSESSMENT
[FreeTextEntry1] : GERD: The patient was advised to avoid late-night meals and dietary indiscretions.  The patient was advised to avoid fried and fatty foods.  The patient was advised to abide by an anti-GERD diet. The patient was previously given a pamphlet for anti-GERD.  The patient and I reviewed the anti-GERD diet at length. I recommend a trial of Pantoprazole 40 mg once a day  for the symptoms.\par History of Weight Loss: The patient complains of weight loss but denies any anorexia. The patient admitted to losing 7 to 10 pounds over 4 weeks. The anxiety improved on smoking Marijuana, Clonazepam and Lexapro. \par Hiatal Hernia: The patient was advised to avoid late-night meals and dietary indiscretions. The patient was advised to avoid fried and fatty foods. The patient was advised to abide by an anti-GERD diet. The patient was given a pamphlet for anti-GERD. The patient and I reviewed the anti-GERD diet at length. I recommend a trial of Pantoprazole 40 mg once a day for the Torres's esophagus.\par Gastritis: The patient has a history of gastritis. The patient is to avoid nonsteroidal anti-inflammatory drugs and aspirin. I recommend a trial of pantoprazole 40 mg once a day for the symptoms and Torres's esophagus. \par Torres’s Esophagus: The patient has a history of Torres’s esophagus on recent upper endoscopy. The patient and I had a long discussion regarding the risk of Torres’s esophagus progressing to esophageal cancer. The patient was told of the importance of acid suppression. The patient was told of the importance of follow-up for repeat endoscopies to assess for dysplasia. The patient agrees and will follow up. I recommend a trial of Pantoprazole 40mg once a day for the Torres’s esophagus. I recommend a repeat upper endoscopy in 2 years to reassess for Torres’s esophagus and dysplasia unless symptomatic. The patient agreed and will follow up for the procedure.\par Gastric AVMs: The patient had a prior upper endoscopy that revealed nonbleeding gastric AVMs. The gastric AVMs were not cauterized at the time. The patient denies any bright red blood per rectum, melena or hematemesis. If bleeding occurs, the patient may require ablation of a gastric AVM\par Diverticulosis: I recommend a high-fiber diet and avoid seeds. The patient is to consider a trial of Metamucil once a day for fiber supplementation. The patient is to also consider a trial of a probiotic such as Align once a day. \par Internal Hemorrhoids: The patient is to consider a trial of Anusol H. C. suppositories one per rectum nightly and Anusol HC2.5% cream apply to affected area twice a day p.r.n. hemorrhoidal bleeding or pain. \par History of Colonic Polyps: The patient has a history of colonic polyps. I recommend a repeat colonoscopy in 3 years to reassess for colonic polyps unless symptomatic. The patient agreed and will follow up for the procedure.\par Family History of Colonic Polyps: The patient has a history of colonic polyps. I recommend a repeat colonoscopy in 3 years to reassess for colonic polyps unless symptomatic. The patient agreed and will follow up for the procedure. \par Family History of Colon Cancer: The patient has a family history of colon cancer. I recommend a repeat colonoscopy in 3 years to reassess for colonic polyps unless symptomatic. The patient agreed and will follow up for the procedure. \par The patient was again advised to followup with his pulmonologist, Dr. Hayes for the lung nodule.  He is aware of the risk of lung cancer and prior exposure to asbestos and knows the importance of followup.\par Follow-up: The patient is to follow-up in the office in 6 months to reassess the symptoms. The patient was told to call the office if any further problems.\par \par \par

## 2021-04-16 NOTE — HISTORY OF PRESENT ILLNESS
[None] : had no significant interval events [Nausea] : denies nausea [Vomiting] : denies vomiting [Diarrhea] : denies diarrhea [Constipation] : denies constipation [Yellow Skin Or Eyes (Jaundice)] : denies jaundice [Abdominal Pain] : denies abdominal pain [Abdominal Swelling] : denies abdominal swelling [Rectal Pain] : denies rectal pain [Wt Loss ___ Lbs] : recent [unfilled] ~Upound(s) weight loss [Heartburn] : heartburn [Wt Gain ___ Lbs] : no recent weight gain [GERD] : no gastroesophageal reflux disease [Hiatus Hernia] : no hiatus hernia [Peptic Ulcer Disease] : no peptic ulcer disease [Pancreatitis] : no pancreatitis [Cholelithiasis] : no cholelithiasis [Kidney Stone] : no kidney stone [Inflammatory Bowel Disease] : no inflammatory bowel disease [Irritable Bowel Syndrome] : no irritable bowel syndrome [Diverticulitis] : no diverticulitis [Alcohol Abuse] : no alcohol abuse [Malignancy] : no malignancy [Abdominal Surgery] : no abdominal surgery [Appendectomy] : no appendectomy [Cholecystectomy] : no cholecystectomy [de-identified] : The patient denies any prior exposure to hepatitis A, B or C. The patient denies any large doses of nonsteroidal anti-inflammatory drugs or acetaminophen. The patient denies sharing needles, razors, nail clippers, nail files, scissors, et cetera. The patient admits to prior EtOH abuse but denies any recent ETOH use, cocaine use and intravenous drug use. The patient denies any prior blood transfusions, sexual indiscretions, tattoos or piercing. The patient admits to having prior surgery. The history of surgery is significant for a prior cholecystectomy, vasectomy, testicle removal, hernia repair, appendectomy and undescended testicle. The patient admits to a family history of GI and liver problems. The patent’s mother had a history of diverticulitis and paternal grandmother with a history of colon cancer.  The patient states that he is feeling better.   The patient is being followed by Dr. Parsons for new onset pneumonia.  The patient’s recent Chest X-ray and told of lung nodule.  The patient was evaluated at [facility] with a CAT scan of the chest.  The patient is being followed by Pulmonologist, Dr. Hayes.  The patient admits to being exposed to asbestos from [employer]. The patient denies any jaundice or pruritus.  The patient denies any chronic lower back pain. The patient denies any abdominal pain.  The patient denies any abdominal gas and bloating.  The patient denies any nausea or vomiting.  The patient complains of gastroesophageal reflux disease  but denies any dysphagia.  The gastroesophageal reflux disease is worse after meals and late at night and in the early morning. The gastroesophageal reflux disease is improved with proton pump inhibitors, H2 blockers and antacids.    The patient complains of shortness of breath but denies any atypical chest pain or palpitations.  The patient denies any diaphoresis. The patient denies any constipation or diarrhea.  The patient has 1 to 2 bowel movements a day.  The patient denies a change in bowel habits.  The patient denies a change in caliber of stool.  The patient denies having mucus discharge with the bowel movements.  The patient denies any bright red blood per rectum, melena or hematemesis.  The patient denies any rectal pain or rectal pruritus.  The patient complains of weight loss but denies any anorexia.  The patient admits to losing 7 to 10 pounds over the past 4 weeks.  He denies any fevers or chills.  The patient had an upper endoscopy performed at the [facility] GI endoscopy suite on January 9, 2020. The upper endoscopy was performed up to the level of the second portion of the duodenum. The upper endoscopy revealed Torres's esophagus, a large hiatal hernia and mild diffuse bile gastritis. Biopsies were taken of the distal esophagus, antrum, body of stomach and duodenum to assess for esophagitis, gastritis and duodenitis. The pathology revealed distal esophagus with squamo-glandular junction composed of unremarkable squamous epithelium and inflamed fragments of cardiac-type mucosa with foci of intestinal metaplasia with no dysplasia identified, gastric antral and body mucosa with chronic inactive gastritis that was negative for Helicobacter pylori and unremarkable small intestinal duodenal mucosa with preserved villous architecture with no evidence of increased intraepithelial lymphocytes, celiac disease, or parasites. The patient had a colonoscopy to the terminal ileum performed at the [facility] GI endoscopy suite on September 24, 2019. The colonoscopy to the terminal ileum revealed moderate pandiverticulosis that was primarily concentrated to the left colon and small internal hemorrhoids. There were no polyps, masses, AVMs or colitis noted. The patient tolerated the procedures well. The patient is being followed by his pulmonologist, Dr. Savage. The CAT scan of the chest performed on August 9, 2019 is pending. The patient was previously evaluated by his PMD for abdominal pain in March 2018 and treated with antibiotics for presumed acute diverticulitis with improvement of his symptoms. The patient had been on Humira every 2 weeks for psoriasis. The CAT scan of the abdomen and pelvis with IV contrast performed on June 26, 2013 revealed extensive colonic diverticulosis, minimal haziness in the peritoneal fat adjacent to the distal and mid descending colon that might indicate minimal diverticulitis. Also noted were a few small calcifications in the inferior pancreatic head consistent with old/chronic pancreatitis. The patient admits to a prior history of EtOH abuse. The patient admits to being abstinent of alcohol for 9 years. The patient admits to a family history of colon cancer and diverticulitis.  [de-identified] : (+) prior smoking 1 to 2 PPD x 10 years, stopped x 20 years, (+) prior ETOH stopped x 10 years, (-) IVDA, (+) current smoking marijuana for anxiety\par

## 2021-10-15 ENCOUNTER — APPOINTMENT (OUTPATIENT)
Dept: GASTROENTEROLOGY | Facility: CLINIC | Age: 61
End: 2021-10-15

## 2021-10-25 RX ORDER — PANTOPRAZOLE 40 MG/1
40 TABLET, DELAYED RELEASE ORAL DAILY
Qty: 30 | Refills: 3 | Status: ACTIVE | COMMUNITY
Start: 2021-04-16 | End: 1900-01-01

## 2021-10-27 ENCOUNTER — RX RENEWAL (OUTPATIENT)
Age: 61
End: 2021-10-27

## 2021-12-13 ENCOUNTER — APPOINTMENT (OUTPATIENT)
Dept: GASTROENTEROLOGY | Facility: CLINIC | Age: 61
End: 2021-12-13
Payer: COMMERCIAL

## 2021-12-13 VITALS
OXYGEN SATURATION: 96 % | HEIGHT: 72 IN | HEART RATE: 103 BPM | BODY MASS INDEX: 24.79 KG/M2 | SYSTOLIC BLOOD PRESSURE: 124 MMHG | TEMPERATURE: 97.4 F | WEIGHT: 183 LBS | DIASTOLIC BLOOD PRESSURE: 77 MMHG

## 2021-12-13 DIAGNOSIS — K29.30 CHRONIC SUPERFICIAL GASTRITIS W/OUT BLEEDING: ICD-10-CM

## 2021-12-13 PROCEDURE — 99213 OFFICE O/P EST LOW 20 MIN: CPT

## 2021-12-13 RX ORDER — CYCLOBENZAPRINE HYDROCHLORIDE 10 MG/1
10 TABLET, FILM COATED ORAL
Refills: 0 | Status: ACTIVE | COMMUNITY

## 2021-12-13 RX ORDER — PREDNISONE 10 MG
TABLET ORAL
Refills: 0 | Status: ACTIVE | COMMUNITY

## 2021-12-13 RX ORDER — PANTOPRAZOLE 40 MG/1
40 TABLET, DELAYED RELEASE ORAL DAILY
Qty: 30 | Refills: 3 | Status: ACTIVE | COMMUNITY
Start: 2021-12-13 | End: 1900-01-01

## 2021-12-13 NOTE — HISTORY OF PRESENT ILLNESS
[None] : had no significant interval events [Nausea] : denies nausea [Vomiting] : denies vomiting [Diarrhea] : denies diarrhea [Constipation] : denies constipation [Yellow Skin Or Eyes (Jaundice)] : denies jaundice [Abdominal Pain] : denies abdominal pain [Abdominal Swelling] : denies abdominal swelling [Rectal Pain] : denies rectal pain [Wt Gain ___ Lbs] : recent [unfilled] ~Upound(s) weight gain [Heartburn] : heartburn [GERD] : gastroesophageal reflux disease [Wt Loss ___ Lbs] : no recent weight loss [Hiatus Hernia] : no hiatus hernia [Peptic Ulcer Disease] : no peptic ulcer disease [Pancreatitis] : no pancreatitis [Cholelithiasis] : no cholelithiasis [Kidney Stone] : no kidney stone [Inflammatory Bowel Disease] : no inflammatory bowel disease [Irritable Bowel Syndrome] : no irritable bowel syndrome [Diverticulitis] : no diverticulitis [Alcohol Abuse] : no alcohol abuse [Malignancy] : no malignancy [Abdominal Surgery] : no abdominal surgery [Appendectomy] : no appendectomy [Cholecystectomy] : no cholecystectomy [de-identified] : The patient denies any prior exposure to hepatitis A, B or C. The patient denies any large doses of nonsteroidal anti-inflammatory drugs or acetaminophen. The patient denies sharing needles, razors, nail clippers, nail files, scissors, et cetera. The patient admits to prior EtOH abuse but denies any recent ETOH use, cocaine use and intravenous drug use. The patient denies any prior blood transfusions, sexual indiscretions, tattoos or piercing. The patient admits to having prior surgery. The history of surgery is significant for a prior cholecystectomy, vasectomy, testicle removal, hernia repair, appendectomy and undescended testicle. The patient admits to a family history of GI and liver problems. The patent’s mother had a history of diverticulitis and paternal grandmother with a history of colon cancer. The patient states that he is feeling better. The patient denies any jaundice or pruritus.  The patient complains of chronic lower back pain. The patient denies any abdominal pain.  The patient denies any abdominal gas and bloating.  The patient denies any nausea or vomiting.  The patient complains of gastroesophageal reflux disease but denies any dysphagia.  The gastroesophageal reflux disease is worse after meals and late at night and in the early morning. The gastroesophageal reflux disease is improved with proton pump inhibitors, H2 blockers and antacids.   The patient denies any atypical chest pain, shortness of breath or palpitations.  The patient admits to occasional episodes of diaphoresis.  The patient denies any constipation or diarrhea.  The patient has 2 to 3 bowel movements a day.  The patient denies a change in bowel habits.  The patient denies a change in caliber of stool.  The patient denies having mucus discharge with the bowel movements.  The patient denies any bright red blood per rectum, melena or hematemesis.  The patient denies any rectal pain or rectal pruritus.  The patient denies any weight loss or anorexia.  The patient admits to gaining 5 to 6 pounds over the past few weeks. The patient previously complained of weight loss but denied any anorexia. The patient admitted to losing 7 to 10 pounds over 4 weeks. He denies any fevers or chills. The patient had an upper endoscopy performed at the [facility] GI endoscopy suite on January 9, 2020. The upper endoscopy was performed up to the level of the second portion of the duodenum. The upper endoscopy revealed Torres's esophagus, a large hiatal hernia and mild diffuse bile gastritis. Biopsies were taken of the distal esophagus, antrum, body of stomach and duodenum to assess for esophagitis, gastritis and duodenitis. The pathology revealed distal esophagus with squamo-glandular junction composed of unremarkable squamous epithelium and inflamed fragments of cardiac-type mucosa with foci of intestinal metaplasia with no dysplasia identified, gastric antral and body mucosa with chronic inactive gastritis that was negative for Helicobacter pylori and unremarkable small intestinal duodenal mucosa with preserved villous architecture with no evidence of increased intraepithelial lymphocytes, celiac disease, or parasites. The patient had a colonoscopy to the terminal ileum performed at the [facility] GI endoscopy suite on September 24, 2019. The colonoscopy to the terminal ileum revealed moderate pandiverticulosis that was primarily concentrated to the left colon and small internal hemorrhoids. There were no polyps, masses, AVMs or colitis noted. The patient tolerated the procedures well. The patient is being followed by Dr. Parsons for new onset pneumonia. The patient’s recent Chest X-ray and told of lung nodule. The patient was evaluated at [facility] with a CAT scan of the chest. The patient is being followed by Pulmonologist, Dr. Hayes. The patient admits to being exposed to asbestos from [employer] employment. The patient was previously evaluated by his PMD for abdominal pain in March 2018 and treated with antibiotics for presumed acute diverticulitis with improvement of his symptoms. The patient had been on Humira every 2 weeks for psoriasis. The CAT scan of the abdomen and pelvis with IV contrast performed on June 26, 2013 revealed extensive colonic diverticulosis, minimal haziness in the peritoneal fat adjacent to the distal and mid descending colon that might indicate minimal diverticulitis. Also noted were a few small calcifications in the inferior pancreatic head consistent with old/chronic pancreatitis. The patient admits to a prior history of EtOH abuse. The patient admits to being abstinent of alcohol for 9 years. The patient admits to a family history of colon cancer and diverticulitis.  [de-identified] : (+) prior smoking 1 to 2 PPD x 10 years, stopped x 20 years, (+) prior ETOH stopped x 10 years, (-) IVDA, (+) current smoking marijuana for anxiety\par

## 2021-12-13 NOTE — ASSESSMENT
[FreeTextEntry1] : GERD: The patient was advised to avoid late-night meals and dietary indiscretions.  The patient was advised to avoid fried and fatty foods.  The patient was advised to abide by an anti-GERD diet. The patient was given a pamphlet for anti-GERD.  The patient and I reviewed the anti-GERD diet at length. I recommend a trial of Pantoprazole 40 mg once a day x 3 months for the symptoms.\par History of Weight Loss: The patient previously complained of weight loss but denied any anorexia. The patient admitted to losing 7 to 10 pounds over 4 weeks. The anxiety improved on smoking Marijuana, Clonazepam and Lexapro. The patient has recently gained weight.\par Hiatal Hernia: The patient was advised to avoid late-night meals and dietary indiscretions. The patient was advised to avoid fried and fatty foods. The patient was advised to abide by an anti-GERD diet. The patient was given a pamphlet for anti-GERD. The patient and I reviewed the anti-GERD diet at length. I recommend a trial of Pantoprazole 40 mg once a day for the Torres's esophagus.\par Gastritis: The patient has a history of gastritis. The patient is to avoid nonsteroidal anti-inflammatory drugs and aspirin. I recommend a trial of pantoprazole 40 mg once a day for the symptoms and Torres's esophagus. \par Torres’s Esophagus: The patient has a history of Torres’s esophagus on recent upper endoscopy. The patient and I had a long discussion regarding the risk of Torres’s esophagus progressing to esophageal cancer. The patient was told of the importance of acid suppression. The patient was told of the importance of follow-up for repeat endoscopies to assess for dysplasia. The patient agrees and will follow up. I recommend a trial of Pantoprazole 40 mg once a day for the Torres’s esophagus. I recommend a repeat upper endoscopy in 2 years to reassess for Torres’s esophagus and dysplasia unless symptomatic. The patient agreed and will follow up for the procedure.\par Gastric AVMs: The patient had a prior upper endoscopy that revealed nonbleeding gastric AVMs. The gastric AVMs were not cauterized at the time. The patient denies any bright red blood per rectum, melena or hematemesis. If bleeding occurs, the patient may require ablation of a gastric AVM\par Diverticulosis: I recommend a high-fiber diet and avoid seeds. The patient is to consider a trial of Metamucil once a day for fiber supplementation. The patient is to also consider a trial of a probiotic such as Align once a day. \par Internal Hemorrhoids: The patient is to consider a trial of Anusol H. C. suppositories one per rectum nightly and Anusol HC2.5% cream apply to affected area twice a day p.r.n. hemorrhoidal bleeding or pain. \par History of Colonic Polyps: The patient has a history of colonic polyps. I recommend a repeat colonoscopy in 3 years to reassess for colonic polyps unless symptomatic. The patient agreed and will follow up for the procedure.\par Family History of Colonic Polyps: The patient has a history of colonic polyps. I recommend a repeat colonoscopy in 3 years to reassess for colonic polyps unless symptomatic. The patient agreed and will follow up for the procedure. \par Family History of Colon Cancer: The patient has a family history of colon cancer. I recommend a repeat colonoscopy in 3 years to reassess for colonic polyps unless symptomatic. The patient agreed and will follow up for the procedure. \par The patient was again advised to followup with his pulmonologist, Dr. Hayes for the lung nodule. He is aware of the risk of lung cancer and prior exposure to asbestos and knows the importance of followup.\par Follow-up: The patient is to follow-up in the office in 6 months to reassess the symptoms. The patient was told to call the office if any further problems.\par \par

## 2022-02-15 ENCOUNTER — NON-APPOINTMENT (OUTPATIENT)
Age: 62
End: 2022-02-15

## 2022-02-16 ENCOUNTER — NON-APPOINTMENT (OUTPATIENT)
Age: 62
End: 2022-02-16

## 2022-02-23 ENCOUNTER — APPOINTMENT (OUTPATIENT)
Dept: GASTROENTEROLOGY | Facility: CLINIC | Age: 62
End: 2022-02-23
Payer: COMMERCIAL

## 2022-02-23 VITALS
DIASTOLIC BLOOD PRESSURE: 68 MMHG | HEIGHT: 72 IN | WEIGHT: 183 LBS | OXYGEN SATURATION: 97 % | TEMPERATURE: 97.9 F | SYSTOLIC BLOOD PRESSURE: 111 MMHG | HEART RATE: 80 BPM | BODY MASS INDEX: 24.79 KG/M2

## 2022-02-23 PROCEDURE — 99214 OFFICE O/P EST MOD 30 MIN: CPT

## 2022-02-23 RX ORDER — SIMETHICONE 180 MG
180 CAPSULE ORAL 4 TIMES DAILY
Qty: 120 | Refills: 3 | Status: ACTIVE | COMMUNITY
Start: 2022-02-23 | End: 1900-01-01

## 2022-02-23 RX ORDER — PANTOPRAZOLE 40 MG/1
40 TABLET, DELAYED RELEASE ORAL DAILY
Qty: 30 | Refills: 3 | Status: ACTIVE | COMMUNITY
Start: 2022-02-23 | End: 1900-01-01

## 2022-02-23 RX ORDER — BIFIDOBACTERIUM LONGUM 10MM CELL
4 CAPSULE ORAL
Qty: 30 | Refills: 3 | Status: ACTIVE | COMMUNITY
Start: 2022-02-23 | End: 1900-01-01

## 2022-02-23 NOTE — HISTORY OF PRESENT ILLNESS
[None] : had no significant interval events [Heartburn] : denies heartburn [Nausea] : resolved nausea [Vomiting] : denies vomiting [Diarrhea] : resolved diarrhea [Constipation] : denies constipation [Yellow Skin Or Eyes (Jaundice)] : denies jaundice [Abdominal Pain] : resolved abdominal pain [Abdominal Swelling] : abdominal swelling resolved [Rectal Pain] : denies rectal pain [Wt Gain ___ Lbs] : no recent weight gain [Wt Loss ___ Lbs] : no recent weight loss [GERD] : no gastroesophageal reflux disease [Hiatus Hernia] : no hiatus hernia [Peptic Ulcer Disease] : no peptic ulcer disease [Pancreatitis] : no pancreatitis [Cholelithiasis] : no cholelithiasis [Kidney Stone] : no kidney stone [Inflammatory Bowel Disease] : no inflammatory bowel disease [Irritable Bowel Syndrome] : no irritable bowel syndrome [Diverticulitis] : no diverticulitis [Alcohol Abuse] : no alcohol abuse [Malignancy] : no malignancy [Abdominal Surgery] : no abdominal surgery [Appendectomy] : no appendectomy [Cholecystectomy] : no cholecystectomy [de-identified] : The patient denies any prior exposure to hepatitis A, B or C. The patient denies any large doses of nonsteroidal anti-inflammatory drugs or acetaminophen. The patient denies sharing needles, razors, nail clippers, nail files, scissors, et cetera. The patient admits to prior EtOH abuse but denies any recent ETOH use, cocaine use and intravenous drug use. The patient denies any prior blood transfusions, sexual indiscretions, tattoos or piercing. The patient admits to having prior surgery. The history of surgery is significant for a prior cholecystectomy, vasectomy, testicle removal, hernia repair, appendectomy and undescended testicle. The patient admits to a family history of GI and liver problems. The patent’s mother had a history of diverticulitis and paternal grandmother with a history of colon cancer. The patient states that he is feeling better.  The patient states that he had a bout of diverticulitis. The patient was evaluated at an urgent care center and was treated with metronidazole and ciprofloxacin for possible acute diverticulitis. The patient had just had a CAT scan of the abdomen pelvis with IV contrast to assess the symptoms. The CAT scan of the abdomen and pelvis with IV contrast performed on February 15, 2022 revealed diverticulosis distal descending colon and sigmoid colon without evidence of acute diverticulitis. There are calcifications in the head of the pancreas occasionally in the uncinate process without change which may be due to chronic pancreatitis. The patient states that he is feeling better with resolution of the abdominal pain and nausea. The patient denies any jaundice or pruritus.  The patient denies any chronic lower back pain. The patient previously complained of abdominal pain.  The patient describes the abdominal pain as a crampy, burning, intermittent left lower quadrant discomfort that is nonradiating in nature.  The abdominal pain is worse with meals and improves with passing gas or having a bowel movement.  The abdominal pain is described as mild to moderate in nature.  The abdominal pain occurs at night and in the morning.  The abdominal pain can occur at any time.   The abdominal pain has never awakened the patient from sleep.  The abdominal pain is relieved with certain medication such as antibiotics.  The abdominal pain was associated with abdominal gas and bloating.  The patient currently denies any abdominal gas and bloating.  The patient previously complained of nausea that resolved spontaneously.  He currently denies any nausea or vomiting.  The patient denies any gastroesophageal reflux disease or dysphagia.  The patient complains of dyspnea upon exertion but denies any atypical chest pain or palpitations.  The patient is being followed by his pulmonologist, Dr. Hayes for his shortness of breath and lung nodule.  The patient currently denies any diaphoresis. The patient previously admitted to episodes of diaphoresis.  The patient previously complained of diarrhea that resolved with antibiotic treatment.  The patient remembers eating food (almonds) prior to the symptoms.  No other friends or family had been ill with similar complaints.  The patient denies any recent travel.  The patient admits to a similar episode in the past that resolved spontaneously.  The patient denies taking antibiotics prior to the symptoms.   The patient currently denies any constipation or diarrhea.  The patient has 1 to 3 bowel movements a day. The diarrhea was described as soft to watery in nature.   The patient complains of a change in bowel habits.  The patient complains of a change in caliber of stool.   The patient denies having mucus discharge with the bowel movements.  The patient denies any bright red blood per rectum, melena or hematemesis.  The patient denies any rectal pain or rectal pruritus.  The patient currently denies any weight loss or anorexia.  He currently denies any fevers or chills.  The patient had an upper endoscopy performed at the [place] GI endoscopy suite on January 9, 2020. The upper endoscopy was performed up to the level of the second portion of the duodenum. The upper endoscopy revealed Torres's esophagus, a large hiatal hernia and mild diffuse bile gastritis. Biopsies were taken of the distal esophagus, antrum, body of stomach and duodenum to assess for esophagitis, gastritis and duodenitis. The pathology revealed distal esophagus with squamo-glandular junction composed of unremarkable squamous epithelium and inflamed fragments of cardiac-type mucosa with foci of intestinal metaplasia with no dysplasia identified, gastric antral and body mucosa with chronic inactive gastritis that was negative for Helicobacter pylori and unremarkable small intestinal duodenal mucosa with preserved villous architecture with no evidence of increased intraepithelial lymphocytes, celiac disease, or parasites. The patient had a colonoscopy to the terminal ileum performed at the [place] GI endoscopy suite on September 24, 2019. The colonoscopy to the terminal ileum revealed moderate pandiverticulosis that was primarily concentrated to the left colon and small internal hemorrhoids. There were no polyps, masses, AVMs or colitis noted. The patient tolerated the procedures well. The patient is being followed by Dr. Parsons for new onset pneumonia. The patient’s recent Chest X-ray and told of lung nodule. The patient was evaluated at [facility] with a CAT scan of the chest. The patient is being followed by Pulmonologist, Dr. Hayes. The patient admits to being exposed to asbestos from [employer] employment. The patient was previously evaluated by his PMD for abdominal pain in March 2018 and treated with antibiotics for presumed acute diverticulitis with improvement of his symptoms. The patient had been on Humira every 2 weeks for psoriasis. The CAT scan of the abdomen and pelvis with IV contrast performed on June 26, 2013 revealed extensive colonic diverticulosis, minimal haziness in the peritoneal fat adjacent to the distal and mid descending colon that might indicate minimal diverticulitis. Also noted were a few small calcifications in the inferior pancreatic head consistent with old/chronic pancreatitis. The patient admits to a prior history of EtOH abuse. The patient admits to being abstinent of alcohol for 9 years. The patient admits to a family history of colon cancer and diverticulitis.  [de-identified] : (+) prior smoking 1 to 2 PPD x 10 years, stopped x 20 years, (+) prior ETOH stopped x 10 years, (-) IVDA, (+) current smoking marijuana for anxiety\par \par

## 2022-02-23 NOTE — ASSESSMENT
[FreeTextEntry1] : Abdominal Pain: The patient complains of abdominal pain. The patient is to avoid nonsteroidal anti-inflammatory drugs and aspirin.  I recommend a trial of Phazyme 1 tablet PO 3 times a day for 3 months for the symptoms.\par Diarrhea: The patient complains of diarrhea.  I recommend a low residue diet. The patient is to avoid fiber supplementation. The patient is to consider starting a trial of a probiotic such as Align once a day.   If the symptoms recur, the patient may require sending stool studies for C+S, O+P x3, and C. difficile to assess for an infectious etiology of the diarrhea.   If the symptoms persist, the patient may require a colonoscopy to assess for colitis versus other causes.  The patient was told of the risks and benefits of the procedure.  The patient was told of the risks of perforation, emergency surgery, bleeding, infections and missed lesions.  The patient agreed and will follow-up to reassess the symptoms.  \par Urgent Care Evaluation: The patient states that he had a bout of diverticulitis. The patient was evaluated at an urgent care center and was treated with metronidazole and ciprofloxacin for possible acute diverticulitis. The patient had just had a CAT scan of the abdomen pelvis with IV contrast to assess the symptoms. The CAT scan of the abdomen and pelvis with IV contrast performed on February 15, 2022 revealed diverticulosis distal descending colon and sigmoid colon without evidence of acute diverticulitis. There are calcifications in the head of the pancreas occasionally in the uncinate process without change which may be due to chronic pancreatitis. The patient states that he is starting to feel better with resolution of the abdominal pain but still feels nauseous. \par History of Weight Loss: The patient previously complained of weight loss but denied any anorexia. The patient admitted to losing 7 to 10 pounds over 4 weeks. The anxiety improved on smoking Marijuana, Clonazepam and Lexapro. The patient has recently gained weight.\par Hiatal Hernia: The patient was advised to avoid late-night meals and dietary indiscretions. The patient was advised to avoid fried and fatty foods. The patient was advised to abide by an anti-GERD diet. The patient was given a pamphlet for anti-GERD. The patient and I reviewed the anti-GERD diet at length. I recommend a trial of Pantoprazole 40 mg once a day for the Torres's esophagus.\par Gastritis: The patient has a history of gastritis. The patient is to avoid nonsteroidal anti-inflammatory drugs and aspirin. I recommend a trial of pantoprazole 40 mg once a day for the symptoms and Torres's esophagus. \par Torres’s Esophagus: The patient has a history of Torres’s esophagus on recent upper endoscopy. The patient and I had a long discussion regarding the risk of Torres’s esophagus progressing to esophageal cancer. The patient was told of the importance of acid suppression. The patient was told of the importance of follow-up for repeat endoscopies to assess for dysplasia. The patient agrees and will follow up. I recommend a trial of Pantoprazole 40 mg once a day for the Torres’s esophagus. I recommend a repeat upper endoscopy in 2 years to reassess for Torres’s esophagus and dysplasia unless symptomatic. The patient agreed and will follow up for the procedure.\par Gastric AVMs: The patient had a prior upper endoscopy that revealed nonbleeding gastric AVMs. The gastric AVMs were not cauterized at the time. The patient denies any bright red blood per rectum, melena or hematemesis. If bleeding occurs, the patient may require ablation of a gastric AVM\par Diverticulosis: I recommend a high-fiber diet and avoid seeds. The patient is to consider a trial of Metamucil once a day for fiber supplementation. The patient is to also consider a trial of a probiotic such as Align once a day. \par Internal Hemorrhoids: The patient is to consider a trial of Anusol H. C. suppositories one per rectum nightly and Anusol HC2.5% cream apply to affected area twice a day p.r.n. hemorrhoidal bleeding or pain. \par History of Colonic Polyps: The patient has a history of colonic polyps. I recommend a repeat colonoscopy in 3 years to reassess for colonic polyps unless symptomatic. The patient agreed and will follow up for the procedure.\par Family History of Colonic Polyps: The patient has a history of colonic polyps. I recommend a repeat colonoscopy in 3 years to reassess for colonic polyps unless symptomatic. The patient agreed and will follow up for the procedure. \par Family History of Colon Cancer: The patient has a family history of colon cancer. I recommend a repeat colonoscopy in 3 years to reassess for colonic polyps unless symptomatic. The patient agreed and will follow up for the procedure. \par The patient was again advised to followup with his pulmonologist, Dr. Hayes for the lung nodule. He is aware of the risk of lung cancer and prior exposure to asbestos and knows the importance of followup.\par Follow-up: The patient is to follow-up in the office in 6 months to reassess the symptoms. The patient was told to call the office if any further problems.\par \par \par

## 2022-06-13 ENCOUNTER — APPOINTMENT (OUTPATIENT)
Dept: GASTROENTEROLOGY | Facility: CLINIC | Age: 62
End: 2022-06-13
Payer: COMMERCIAL

## 2022-06-13 VITALS
BODY MASS INDEX: 24.79 KG/M2 | WEIGHT: 183 LBS | HEART RATE: 88 BPM | OXYGEN SATURATION: 97 % | HEIGHT: 72 IN | DIASTOLIC BLOOD PRESSURE: 70 MMHG | SYSTOLIC BLOOD PRESSURE: 124 MMHG | TEMPERATURE: 97.6 F

## 2022-06-13 PROCEDURE — 99213 OFFICE O/P EST LOW 20 MIN: CPT

## 2022-06-13 RX ORDER — CIPROFLOXACIN HYDROCHLORIDE 500 MG/1
500 TABLET, FILM COATED ORAL
Qty: 14 | Refills: 0 | Status: ACTIVE | COMMUNITY
Start: 2022-02-13

## 2022-06-13 RX ORDER — DOXYCYCLINE 100 MG/1
100 CAPSULE ORAL
Qty: 14 | Refills: 0 | Status: ACTIVE | COMMUNITY
Start: 2022-04-15

## 2022-06-13 RX ORDER — GABAPENTIN 300 MG/1
300 CAPSULE ORAL
Qty: 30 | Refills: 0 | Status: ACTIVE | COMMUNITY
Start: 2021-12-28

## 2022-06-13 RX ORDER — PREGABALIN 75 MG/1
75 CAPSULE ORAL
Qty: 90 | Refills: 0 | Status: ACTIVE | COMMUNITY
Start: 2022-01-04

## 2022-06-13 RX ORDER — METRONIDAZOLE 500 MG/1
500 TABLET ORAL
Qty: 14 | Refills: 0 | Status: ACTIVE | COMMUNITY
Start: 2022-02-13

## 2022-06-13 RX ORDER — IXEKIZUMAB 80 MG/ML
80 INJECTION, SOLUTION SUBCUTANEOUS
Qty: 3 | Refills: 0 | Status: ACTIVE | COMMUNITY
Start: 2022-03-28

## 2022-06-13 RX ORDER — VENLAFAXINE HYDROCHLORIDE 37.5 MG/1
37.5 CAPSULE, EXTENDED RELEASE ORAL
Qty: 90 | Refills: 0 | Status: ACTIVE | COMMUNITY
Start: 2022-04-27

## 2022-06-13 RX ORDER — PANTOPRAZOLE 40 MG/1
40 TABLET, DELAYED RELEASE ORAL DAILY
Qty: 30 | Refills: 3 | Status: ACTIVE | COMMUNITY
Start: 2022-06-13 | End: 1900-01-01

## 2022-06-13 RX ORDER — VENLAFAXINE HYDROCHLORIDE 75 MG/1
75 CAPSULE, EXTENDED RELEASE ORAL
Qty: 30 | Refills: 0 | Status: ACTIVE | COMMUNITY
Start: 2021-11-16

## 2022-06-13 RX ORDER — FENOFIBRIC ACID 45 MG/1
45 CAPSULE, DELAYED RELEASE ORAL
Qty: 30 | Refills: 0 | Status: ACTIVE | COMMUNITY
Start: 2021-12-21

## 2022-06-13 NOTE — HISTORY OF PRESENT ILLNESS
[None] : had no significant interval events [Nausea] : denies nausea [Vomiting] : denies vomiting [Diarrhea] : denies diarrhea [Constipation] : denies constipation [Yellow Skin Or Eyes (Jaundice)] : denies jaundice [Abdominal Pain] : denies abdominal pain [Abdominal Swelling] : denies abdominal swelling [Rectal Pain] : denies rectal pain [Heartburn] : heartburn [GERD] : gastroesophageal reflux disease [Wt Gain ___ Lbs] : no recent weight gain [Wt Loss ___ Lbs] : no recent weight loss [Hiatus Hernia] : hiatus hernia [Peptic Ulcer Disease] : no peptic ulcer disease [Pancreatitis] : no pancreatitis [Cholelithiasis] : no cholelithiasis [Kidney Stone] : no kidney stone [Inflammatory Bowel Disease] : no inflammatory bowel disease [Irritable Bowel Syndrome] : no irritable bowel syndrome [Diverticulitis] : no diverticulitis [Alcohol Abuse] : no alcohol abuse [Malignancy] : no malignancy [Abdominal Surgery] : no abdominal surgery [Appendectomy] : no appendectomy [Cholecystectomy] : no cholecystectomy [de-identified] : The patient denies any prior exposure to hepatitis A, B or C. The patient denies any large doses of nonsteroidal anti-inflammatory drugs or acetaminophen. The patient denies sharing needles, razors, nail clippers, nail files, scissors, et cetera. The patient admits to prior EtOH abuse but denies any recent ETOH use, cocaine use and intravenous drug use. The patient denies any prior blood transfusions, sexual indiscretions, tattoos or piercing. The patient admits to having prior surgery. The history of surgery is significant for a prior cholecystectomy, vasectomy, testicle removal, hernia repair, appendectomy and undescended testicle. The patient admits to a family history of GI and liver problems. The patent’s mother had a history of diverticulitis and paternal grandmother with a history of colon cancer. The patient states that he is feeling fine. The patient denies any jaundice or pruritus.  The patient denies any chronic lower back pain. The patient denies any abdominal pain.  The patient denies any abdominal gas and bloating.  The patient denies any nausea or vomiting.  The patient complains of occasional gastroesophageal reflux disease but denies any dysphagia.  The gastroesophageal reflux disease is worse after meals and late at night. The gastroesophageal reflux disease is improved with proton pump inhibitors, H2 blockers and antacids.   The patient complains of shortness of breath secondary to COPD but denies any atypical chest pain or palpitations.  The patient is being followed by his pulmonologist, Dr. Hayes for his COPD and left lung nodule. The patient admits to occasional episodes of diaphoresis.  The patient denies any constipation or diarrhea.  The patient has 2 to 3 bowel movements a day. The patient denies a change in bowel habits.  The patient denies a change in caliber of stool.  The patient denies having mucus discharge with the bowel movements.  The patient denies any bright red blood per rectum, melena or hematemesis.  The patient denies any rectal pain or rectal pruritus.  The patient denies any weight loss or anorexia.  He denies any fevers or chills.  The patient states that he had a bout of diverticulitis. The patient was evaluated at an urgent care center and was treated with metronidazole and ciprofloxacin for possible acute diverticulitis. The patient had just had a CAT scan of the abdomen pelvis with IV contrast to assess the symptoms. The CAT scan of the abdomen and pelvis with IV contrast performed on February 15, 2022 revealed diverticulosis distal descending colon and sigmoid colon without evidence of acute diverticulitis. There are calcifications in the head of the pancreas occasionally in the uncinate process without change which may be due to chronic pancreatitis. The patient had an upper endoscopy performed at the [facility] on January 9, 2020. The upper endoscopy was performed up to the level of the second portion of the duodenum. The upper endoscopy revealed Torres's esophagus, a large hiatal hernia and mild diffuse bile gastritis. Biopsies were taken of the distal esophagus, antrum, body of stomach and duodenum to assess for esophagitis, gastritis and duodenitis. The pathology revealed distal esophagus with squamo-glandular junction composed of unremarkable squamous epithelium and inflamed fragments of cardiac-type mucosa with foci of intestinal metaplasia with no dysplasia identified, gastric antral and body mucosa with chronic inactive gastritis that was negative for Helicobacter pylori and unremarkable small intestinal duodenal mucosa with preserved villous architecture with no evidence of increased intraepithelial lymphocytes, celiac disease, or parasites. The patient had a colonoscopy to the terminal ileum performed at the [facility] on September 24, 2019. The colonoscopy to the terminal ileum revealed moderate pandiverticulosis that was primarily concentrated to the left colon and small internal hemorrhoids. There were no polyps, masses, AVMs or colitis noted. The patient tolerated the procedures well. The patient is being followed by Dr. Parsons for new onset pneumonia. The patient’s recent Chest X-ray and told of lung nodule. The patient was evaluated at [facility] with a CAT scan of the chest. The patient is being followed by his pulmonologist, Dr. Hayes for his shortness of breath and lung nodule. The patient is being followed by Pulmonologist, Dr. Hayes. The patient admits to being exposed to asbestos from [employer] employment. The patient was previously evaluated by his PMD for abdominal pain in March 2018 and treated with antibiotics for presumed acute diverticulitis with improvement of his symptoms. The patient had been on Humira every 2 weeks for psoriasis. The CAT scan of the abdomen and pelvis with IV contrast performed on June 26, 2013 revealed extensive colonic diverticulosis, minimal haziness in the peritoneal fat adjacent to the distal and mid descending colon that might indicate minimal diverticulitis. Also noted were a few small calcifications in the inferior pancreatic head consistent with old/chronic pancreatitis. The patient admits to a prior history of EtOH abuse. The patient admits to being abstinent of alcohol for 9 years. The patient admits to a family history of colon cancer and diverticulitis.  [de-identified] : (+) prior smoking 1 to 2 PPD x 10 years, stopped x 20 years, (+) prior ETOH stopped x 10 years, (-) IVDA, (+) current smoking marijuana for anxiety\par \par

## 2022-06-13 NOTE — ASSESSMENT
[FreeTextEntry1] : GERD: The patient was advised to avoid late-night meals and dietary indiscretions.  The patient was advised to avoid fried and fatty foods.  The patient was advised to abide by an anti-GERD diet. The patient was given a pamphlet for anti-GERD.  The patient and I reviewed the anti-GERD diet at length. I recommend a trial of Pantoprazole 40 mg once a day x 3 months for the symptoms.\par Urgent Care Evaluation: The patient states that he had a bout of diverticulitis. The patient was evaluated at an urgent care center and was treated with metronidazole and ciprofloxacin for possible acute diverticulitis. The patient had just had a CAT scan of the abdomen pelvis with IV contrast to assess the symptoms. The CAT scan of the abdomen and pelvis with IV contrast performed on February 15, 2022 revealed diverticulosis distal descending colon and sigmoid colon without evidence of acute diverticulitis. There are calcifications in the head of the pancreas occasionally in the uncinate process without change which may be due to chronic pancreatitis. The patient states that he is starting to feel better with resolution of the abdominal pain but still feels nauseous. \par History of Weight Loss: The patient previously complained of weight loss but denied any anorexia. The patient admitted to losing 7 to 10 pounds over 4 weeks. The anxiety improved on smoking Marijuana, Clonazepam and Lexapro. The patient has recently gained weight.\par Hiatal Hernia: The patient was advised to avoid late-night meals and dietary indiscretions. The patient was advised to avoid fried and fatty foods. The patient was advised to abide by an anti-GERD diet. The patient was given a pamphlet for anti-GERD. The patient and I reviewed the anti-GERD diet at length. I recommend a trial of Pantoprazole 40 mg once a day for the Torres's esophagus.\par Gastritis: The patient has a history of gastritis. The patient is to avoid nonsteroidal anti-inflammatory drugs and aspirin. I recommend a trial of pantoprazole 40 mg once a day for the symptoms and Torres's esophagus. \par Torres’s Esophagus: The patient has a history of Torres’s esophagus on recent upper endoscopy. The patient and I had a long discussion regarding the risk of Torres’s esophagus progressing to esophageal cancer. The patient was told of the importance of acid suppression. The patient was told of the importance of follow-up for repeat endoscopies to assess for dysplasia. The patient agrees and will follow up. I recommend a trial of Pantoprazole 40 mg once a day for the Torres’s esophagus. I recommend a repeat upper endoscopy in 1 year to reassess for Torres’s esophagus and dysplasia unless symptomatic. The patient agreed and will follow up for the procedure.\par Gastric AVMs: The patient had a prior upper endoscopy that revealed nonbleeding gastric AVMs. The gastric AVMs were not cauterized at the time. The patient denies any bright red blood per rectum, melena or hematemesis. If bleeding occurs, the patient may require ablation of a gastric AVM\par Diverticulosis: I recommend a high-fiber diet and avoid seeds. The patient is to consider a trial of Metamucil once a day for fiber supplementation. The patient is to also consider a trial of a probiotic such as Align once a day. \par Internal Hemorrhoids: The patient is to consider a trial of Anusol H. C. suppositories one per rectum nightly and Anusol HC2.5% cream apply to affected area twice a day p.r.n. hemorrhoidal bleeding or pain. \par History of Colonic Polyps: The patient has a history of colonic polyps. I recommend a repeat colonoscopy in 1 year to reassess for colonic polyps unless symptomatic. The patient agreed and will follow up for the procedure.\par Family History of Colonic Polyps: The patient has a history of colonic polyps. I recommend a repeat colonoscopy in 1 year to reassess for colonic polyps unless symptomatic. The patient agreed and will follow up for the procedure. \par Family History of Colon Cancer: The patient has a family history of colon cancer. I recommend a repeat colonoscopy in 1 year to reassess for colonic polyps unless symptomatic. The patient agreed and will follow up for the procedure. \par Lung Nodule: The patient was again advised to followup with his pulmonologist, Dr. Hayes for the lung nodule. He is aware of the risk of lung cancer and prior exposure to asbestos and knows the importance of followup.\par Follow-up: The patient is to follow-up in the office in 12 months to reassess the symptoms. The patient was told to call the office if any further problems.\par

## 2022-08-10 ENCOUNTER — APPOINTMENT (OUTPATIENT)
Dept: GASTROENTEROLOGY | Facility: CLINIC | Age: 62
End: 2022-08-10

## 2022-08-10 VITALS
HEART RATE: 86 BPM | WEIGHT: 183 LBS | SYSTOLIC BLOOD PRESSURE: 106 MMHG | HEIGHT: 72 IN | DIASTOLIC BLOOD PRESSURE: 71 MMHG | BODY MASS INDEX: 24.79 KG/M2 | OXYGEN SATURATION: 95 % | TEMPERATURE: 97.3 F

## 2022-08-10 DIAGNOSIS — R11.0 NAUSEA: ICD-10-CM

## 2022-08-10 PROCEDURE — 99214 OFFICE O/P EST MOD 30 MIN: CPT

## 2022-08-10 RX ORDER — ONDANSETRON 4 MG/1
4 TABLET, ORALLY DISINTEGRATING ORAL TWICE DAILY
Qty: 60 | Refills: 3 | Status: ACTIVE | COMMUNITY
Start: 2022-08-10 | End: 1900-01-01

## 2022-08-10 RX ORDER — ERGOCALCIFEROL 1.25 MG/1
1.25 MG CAPSULE, LIQUID FILLED ORAL
Qty: 4 | Refills: 0 | Status: ACTIVE | COMMUNITY
Start: 2022-07-10

## 2022-08-10 RX ORDER — CHOLESTYRAMINE 4 G/9G
4 POWDER, FOR SUSPENSION ORAL DAILY
Qty: 30 | Refills: 3 | Status: ACTIVE | COMMUNITY
Start: 2022-08-10 | End: 1900-01-01

## 2022-08-10 RX ORDER — ATORVASTATIN CALCIUM 40 MG/1
40 TABLET, FILM COATED ORAL
Qty: 30 | Refills: 0 | Status: ACTIVE | COMMUNITY
Start: 2022-06-15

## 2022-08-10 NOTE — ASSESSMENT
[FreeTextEntry1] : Dyspepsia: The patient complains of dyspeptic symptoms.  The patient was advised to continue to abide by an anti-gas (low FOD-MAP) diet.  The patient was previously given a pamphlet for anti-gas (low FOD-MAP).  The patient and I reviewed the anti-gas (low FOD-MAP)diet at length again. The patient is to continue on a trial of Simethicone one tablet 4 times a day p.r.n. abdominal pain and gas.\par GERD: The patient was advised to avoid late-night meals and dietary indiscretions.  The patient was advised to avoid fried and fatty foods.  The patient was advised to abide by an anti-GERD diet. The patient was given a pamphlet for anti-GERD.  The patient and I reviewed the anti-GERD diet at length. I recommend a trial of famotidine 40 mg twice a day x 3 months for the symptoms.\par Nausea: The patient complains of nausea. If the symptoms of nausea persists, the patient may require a trial of Zofran 4 mg twice a day. \par Diarrhea: The patient complains of diarrhea.  I recommend a low residue diet. The patient is to avoid fiber supplementation. The patient is to consider starting a trial of a probiotic such as Align once a day.   I recommend sending stool studies for C+S, O+P x3, and C. difficile to assess for an infectious etiology of the diarrhea.  The symptoms are worse after meals.  The patient has a history of cholecystectomy.  I recommend a trial of cholestyramine one packet once a day for possible bile induced diarrhea. If the symptoms persist, the patient may require a colonoscopy to assess for colitis versus other causes.  The patient was told of the risks and benefits of the procedure.  The patient was told of the risks of perforation, emergency surgery, bleeding, infections and missed lesions.  The patient agreed and will follow-up to reassess the symptoms.  \par Urgent Care Evaluation: The patient states that he had a bout of diverticulitis. The patient was evaluated at an urgent care center and was treated with metronidazole and ciprofloxacin for possible acute diverticulitis. The patient had just had a CAT scan of the abdomen pelvis with IV contrast to assess the symptoms. The CAT scan of the abdomen and pelvis with IV contrast performed on February 15, 2022 revealed diverticulosis distal descending colon and sigmoid colon without evidence of acute diverticulitis. There are calcifications in the head of the pancreas occasionally in the uncinate process without change which may be due to chronic pancreatitis. The patient states that he is starting to feel better with resolution of the abdominal pain but still feels nauseous. \par History of Weight Loss: The patient previously complained of weight loss but denied any anorexia. The patient admitted to losing 7 to 10 pounds over 4 weeks. The anxiety improved on smoking Marijuana, Clonazepam and Lexapro. The patient has recently gained weight.\par Hiatal Hernia: The patient was advised to avoid late-night meals and dietary indiscretions. The patient was advised to avoid fried and fatty foods. The patient was advised to abide by an anti-GERD diet. The patient was given a pamphlet for anti-GERD. The patient and I reviewed the anti-GERD diet at length. I recommend a trial of Pantoprazole 40 mg once a day for the Torres's esophagus.\par Gastritis: The patient has a history of gastritis. The patient is to avoid nonsteroidal anti-inflammatory drugs and aspirin. I recommend a trial of pantoprazole 40 mg once a day for the symptoms and Torres's esophagus. \par Torres’s Esophagus: The patient has a history of Torres’s esophagus on recent upper endoscopy. The patient and I had a long discussion regarding the risk of Torres’s esophagus progressing to esophageal cancer. The patient was told of the importance of acid suppression. The patient was told of the importance of follow-up for repeat endoscopies to assess for dysplasia. The patient agrees and will follow up. I recommend a trial of Pantoprazole 40 mg once a day for the Torres’s esophagus. I recommend a repeat upper endoscopy in 1 year to reassess for Torres’s esophagus and dysplasia unless symptomatic. The patient agreed and will follow up for the procedure.\par Gastric AVMs: The patient had a prior upper endoscopy that revealed nonbleeding gastric AVMs. The gastric AVMs were not cauterized at the time. The patient denies any bright red blood per rectum, melena or hematemesis. If bleeding occurs, the patient may require ablation of a gastric AVM\par Diverticulosis: I recommend a low residue diet and avoid seeds. The patient is to avoid Metamucil or fiber supplementation. The patient is to also consider a trial of a probiotic such as Align once a day. \par Internal Hemorrhoids: The patient is to consider a trial of Anusol H. C. suppositories one per rectum nightly and Anusol HC2.5% cream apply to affected area twice a day p.r.n. hemorrhoidal bleeding or pain. \par History of Colonic Polyps: The patient has a history of colonic polyps. I recommend a repeat colonoscopy in 1 year to reassess for colonic polyps unless symptomatic. The patient agreed and will follow up for the procedure.\par Family History of Colonic Polyps: The patient has a history of colonic polyps. I recommend a repeat colonoscopy in 1 year to reassess for colonic polyps unless symptomatic. The patient agreed and will follow up for the procedure. \par Family History of Colon Cancer: The patient has a family history of colon cancer. I recommend a repeat colonoscopy in 1 year to reassess for colonic polyps unless symptomatic. The patient agreed and will follow up for the procedure. \par Lung Nodule: The patient was again advised to followup with his pulmonologist, Dr. Hayes for the lung nodule. He is aware of the risk of lung cancer and prior exposure to asbestos and knows the importance of followup.\par Follow-up: The patient is to follow-up in the office in 3 months to reassess the symptoms. The patient was told to call the office if any further problems.\par \par \par \par

## 2022-08-10 NOTE — HISTORY OF PRESENT ILLNESS
[None] : had no significant interval events [Vomiting] : denies vomiting [Constipation] : denies constipation [Yellow Skin Or Eyes (Jaundice)] : denies jaundice [Rectal Pain] : denies rectal pain [Heartburn] : heartburn [Nausea] : nausea [Diarrhea] : diarrhea [Abdominal Pain] : abdominal pain [Abdominal Swelling] : abdominal swelling [GERD] : gastroesophageal reflux disease [Hiatus Hernia] : hiatus hernia [Cholelithiasis] : cholelithiasis [Diverticulitis] : diverticulitis [Cholecystectomy] : cholecystectomy [Wt Gain ___ Lbs] : no recent weight gain [Wt Loss ___ Lbs] : no recent weight loss [Peptic Ulcer Disease] : no peptic ulcer disease [Pancreatitis] : no pancreatitis [Kidney Stone] : no kidney stone [Inflammatory Bowel Disease] : no inflammatory bowel disease [Irritable Bowel Syndrome] : no irritable bowel syndrome [Alcohol Abuse] : no alcohol abuse [Malignancy] : no malignancy [Abdominal Surgery] : no abdominal surgery [Appendectomy] : no appendectomy [de-identified] : The blood work performed on June 2, 2022 revealed an elevated total cholesterol of 213 mg/dL, a normal triglyceride level of 147 mg/dL, no evidence of anemia with a hemoglobin/hematocrit level of 14.5/41.4, respectively, a normal free thyroxine level of 1.00 ng/dL, a normal folic acid level of 9.59ng/mL, a normal vitamin B12 level of 321 pg/mL, a normal PSA of 1.06 ng/mL, a normal free PSA of 0.28 ng/mL, a normal free PSA percent of 26%, a low CO2 of 18 mmol/L, normal liver enzymes with a total bilirubin of 0.3 mg/dL, a normal alkaline phosphatase/AST/ALT of 46/19/20 U/L, respectively, a low vitamin D level of 21 ng/mL. [de-identified] : The patient denies any prior exposure to hepatitis A, B or C. The patient denies any large doses of nonsteroidal anti-inflammatory drugs or acetaminophen. The patient denies sharing needles, razors, nail clippers, nail files, scissors, et cetera. The patient admits to prior EtOH abuse but denies any recent ETOH use, cocaine use and intravenous drug use. The patient denies any prior blood transfusions, sexual indiscretions, tattoos or piercing. The patient admits to having prior surgery. The history of surgery is significant for a prior cholecystectomy, vasectomy, testicle removal, hernia repair, appendectomy and undescended testicle. The patient admits to a family history of GI and liver problems. The patent’s mother had a history of diverticulitis and paternal grandmother with a history of colon cancer. The patient states that he is feeling uncomfortable x 8 weeks. The patient denies any jaundice or pruritus.  The patient denies any lower back pain. The patient denies any abdominal pain.  The patient complains of abdominal gas and bloating.  The patient complains of nausea and occasional dry heaves but denies any vomiting.  The patient complains of occasional gastroesophageal reflux disease but denies any dysphagia.  The gastroesophageal reflux disease is worse after meals and late at night and in the early morning. The gastroesophageal reflux disease is improved with proton pump inhibitors, H2 blockers and antacids.   The patient complains of dyspnea upon exertion secondary to COPD but denies any atypical chest pain, shortness of breath or palpitations.  The patient admits to smoking marijuana.   The patient admits to occasional episodes of diaphoresis.  The patient complains of occasional diarrhea but denies any constipation.  The patient does not remember eating food prior to the symptoms.  No other friends or family had been ill with similar complaints.  The patient denies any recent travel.  The patient admits to a similar episode in the past that resolved spontaneously.  The patient denies taking antibiotics prior to the symptoms.  The patient has 2 to 5 bowel movements a day. The diarrhea is described as soft to watery in nature.   The patient complains of a change in bowel habits.  The patient complains of a change in caliber of stool.   The patient denies having mucus discharge with the bowel movements.  The patient denies any bright red blood per rectum, melena or hematemesis.  The patient denies any rectal pain or rectal pruritus.  The patient denies any weight loss or anorexia.  He denies any fevers or chills.  The patient had a prior cholecystectomy.   The patient was evaluated at an urgent care center and was treated with metronidazole and ciprofloxacin for possible acute diverticulitis. The patient had just had a CAT scan of the abdomen pelvis with IV contrast to assess the symptoms. The CAT scan of the abdomen and pelvis with IV contrast performed on February 15, 2022 revealed diverticulosis distal descending colon and sigmoid colon without evidence of acute diverticulitis. There are calcifications in the head of the pancreas occasionally in the uncinate process without change which may be due to chronic pancreatitis. The patient had an upper endoscopy performed at the [facility] endoscopy suite on January 9, 2020. The upper endoscopy was performed up to the level of the second portion of the duodenum. The upper endoscopy revealed Torres's esophagus, a large hiatal hernia and mild diffuse bile gastritis. Biopsies were taken of the distal esophagus, antrum, body of stomach and duodenum to assess for esophagitis, gastritis and duodenitis. The pathology revealed distal esophagus with squamo-glandular junction composed of unremarkable squamous epithelium and inflamed fragments of cardiac-type mucosa with foci of intestinal metaplasia with no dysplasia identified, gastric antral and body mucosa with chronic inactive gastritis that was negative for Helicobacter pylori and unremarkable small intestinal duodenal mucosa with preserved villous architecture with no evidence of increased intraepithelial lymphocytes, celiac disease, or parasites. The patient had a colonoscopy to the terminal ileum performed at the [facility] endoscopy suite on September 24, 2019. The colonoscopy to the terminal ileum revealed moderate pandiverticulosis that was primarily concentrated to the left colon and small internal hemorrhoids. There were no polyps, masses, AVMs or colitis noted. The patient tolerated the procedures well. The patient is being followed by Dr. Parsons for new onset pneumonia. The patient’s recent Chest X-ray and told of lung nodule. The patient was evaluated at [facility] with a CAT scan of the chest. The patient is being followed by his pulmonologist, Dr. Hayes for his shortness of breath and lung nodule.  The blood work performed on June 2, 2022 revealed an elevated total cholesterol of 213 mg/dL, a normal triglyceride level of 147 mg/dL, no evidence of anemia with a hemoglobin/hematocrit level of 14.5/41.4, respectively, a normal free thyroxine level of 1.00 ng/dL, a normal folic acid level of 9.59ng/mL, a normal vitamin B12 level of 321 pg/mL, a normal PSA of 1.06 ng/mL, a normal free PSA of 0.28 ng/mL, a normal free PSA percent of 26%, a low CO2 of 18 mmol/L, normal liver enzymes with a total bilirubin of 0.3 mg/dL, a normal alkaline phosphatase/AST/ALT of 46/19/20 U/L, respectively, a low vitamin D level of 21 ng/mL.The patient is being followed by Pulmonologist, Dr. Hayes. 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 The patient was previously evaluated by his PMD for abdominal pain in March 2018 and treated with antibiotics for presumed acute diverticulitis with improvement of his symptoms. The patient had been on Humira every 2 weeks for psoriasis. The CAT scan of the abdomen and pelvis with IV contrast performed on June 26, 2013 revealed extensive colonic diverticulosis, minimal haziness in the peritoneal fat adjacent to the distal and mid descending colon that might indicate minimal diverticulitis. Also noted were a few small calcifications in the inferior pancreatic head consistent with old/chronic pancreatitis. The patient admits to a prior history of EtOH abuse. The patient admits to being abstinent of alcohol for 9 years. The patient admits to a family history of colon cancer and diverticulitis.  [de-identified] : (+) prior smoking 1 to 2 PPD x 10 years, stopped x 20 years, (+) prior ETOH stopped x 10 years, (-) IVDA, (+) current smoking marijuana for anxiety\par

## 2022-08-15 LAB — HEMOCCULT STL QL: NEGATIVE

## 2022-08-24 ENCOUNTER — NON-APPOINTMENT (OUTPATIENT)
Age: 62
End: 2022-08-24

## 2022-08-25 ENCOUNTER — NON-APPOINTMENT (OUTPATIENT)
Age: 62
End: 2022-08-25

## 2022-08-25 LAB
BACTERIA STL CULT: NORMAL
C DIFF TOXIN B QL PCR REFLEX: NORMAL
CALPROTECTIN FECAL: 21 UG/G
DEPRECATED O AND P PREP STL: NORMAL
E HISTOLYT AG STL IA-ACNC: NOT DETECTED
FAT STL QN: NORMAL
FAT STL QN: NORMAL
G LAMBLIA AG STL QL: NORMAL
GDH ANTIGEN: NOT DETECTED
GI PCR PANEL, STOOL: NORMAL
LACTOFERRIN STL-MCNC: <1
PANCREATIC ELASTASE, FECAL: 260
RV AG STL QL IA: NORMAL
TOXIN A AND B: NOT DETECTED

## 2022-11-07 ENCOUNTER — APPOINTMENT (OUTPATIENT)
Dept: GASTROENTEROLOGY | Facility: CLINIC | Age: 62
End: 2022-11-07

## 2022-11-07 VITALS
HEART RATE: 92 BPM | OXYGEN SATURATION: 95 % | SYSTOLIC BLOOD PRESSURE: 136 MMHG | DIASTOLIC BLOOD PRESSURE: 79 MMHG | WEIGHT: 182 LBS | TEMPERATURE: 97.2 F | HEIGHT: 72 IN | BODY MASS INDEX: 24.65 KG/M2

## 2022-11-07 PROCEDURE — 99214 OFFICE O/P EST MOD 30 MIN: CPT

## 2022-11-07 RX ORDER — SERTRALINE HYDROCHLORIDE 50 MG/1
50 TABLET, FILM COATED ORAL
Qty: 90 | Refills: 0 | Status: ACTIVE | COMMUNITY
Start: 2022-10-19

## 2022-11-07 NOTE — ASSESSMENT
[FreeTextEntry1] : GERD: The patient was advised to avoid late-night meals and dietary indiscretions.  The patient was advised to avoid fried and fatty foods.  The patient was advised to abide by an anti-GERD diet. The patient was given a pamphlet for anti-GERD.  The patient and I reviewed the anti-GERD diet at length. I recommend a trial of Pantoprazole 40 mg once a day  for the symptoms.\par Urgent Care Evaluation: The patient states that he had a bout of diverticulitis. The patient was evaluated at an urgent care center and was treated with metronidazole and ciprofloxacin for possible acute diverticulitis. The patient had just had a CAT scan of the abdomen pelvis with IV contrast to assess the symptoms. The CAT scan of the abdomen and pelvis with IV contrast performed on February 15, 2022 revealed diverticulosis distal descending colon and sigmoid colon without evidence of acute diverticulitis. There are calcifications in the head of the pancreas occasionally in the uncinate process without change which may be due to chronic pancreatitis. The patient states that he is starting to feel better with resolution of the abdominal pain but still feels nauseous. \par History of Weight Loss: The patient previously complained of weight loss but denied any anorexia. The patient admitted to losing 7 to 10 pounds over 4 weeks. The anxiety improved on smoking Marijuana, Clonazepam and Lexapro. The patient has recently gained weight.\par Hiatal Hernia: The patient was advised to avoid late-night meals and dietary indiscretions. The patient was advised to avoid fried and fatty foods. The patient was advised to abide by an anti-GERD diet. The patient was given a pamphlet for anti-GERD. The patient and I reviewed the anti-GERD diet at length. I recommend a trial of Pantoprazole 40 mg once a day for the Torres's esophagus.\par Gastritis: The patient has a history of gastritis. The patient is to avoid nonsteroidal anti-inflammatory drugs and aspirin. I recommend a trial of pantoprazole 40 mg once a day for the symptoms and Torres's esophagus. \par Torres’s Esophagus: The patient has a history of Torres’s esophagus on recent upper endoscopy. The patient and I had a long discussion regarding the risk of Torres’s esophagus progressing to esophageal cancer. The patient was told of the importance of acid suppression. The patient was told of the importance of follow-up for repeat endoscopies to assess for dysplasia. The patient agrees and will follow up. I recommend a trial of Pantoprazole 40 mg once a day for the Torres’s esophagus. I recommend a repeat upper endoscopy in 1 year to reassess for Torres’s esophagus and dysplasia unless symptomatic. The patient agreed and will follow up for the procedure.\par Gastric AVMs: The patient had a prior upper endoscopy that revealed nonbleeding gastric AVMs. The gastric AVMs were not cauterized at the time. The patient denies any bright red blood per rectum, melena or hematemesis. If bleeding occurs, the patient may require ablation of a gastric AVM\par Diverticulosis: I recommend a low residue diet and avoid seeds. The patient is to avoid Metamucil or fiber supplementation. The patient is to also consider a trial of a probiotic such as Align once a day. \par Internal Hemorrhoids: The patient is to consider a trial of Anusol H. C. suppositories one per rectum nightly and Anusol HC2.5% cream apply to affected area twice a day p.r.n. hemorrhoidal bleeding or pain. \par History of Colonic Polyps: The patient has a history of colonic polyps. I recommend a repeat colonoscopy in 1 year to reassess for colonic polyps unless symptomatic. The patient agreed and will follow up for the procedure.\par Family History of Colonic Polyps: The patient has a history of colonic polyps. I recommend a repeat colonoscopy in 1 year to reassess for colonic polyps unless symptomatic. The patient agreed and will follow up for the procedure. \par Family History of Colon Cancer: The patient has a family history of colon cancer. I recommend a repeat colonoscopy in 1 year to reassess for colonic polyps unless symptomatic. The patient agreed and will follow up for the procedure. \par Lung Nodule: The patient was again advised to followup with his pulmonologist, Dr. Hayes for the lung nodule. He is aware of the risk of lung cancer and prior exposure to asbestos and knows the importance of followup.\par Follow-up: The patient is to follow-up in the office in 6 months to reassess the symptoms. The patient was told to call the office if any further problems.\par

## 2022-11-07 NOTE — HISTORY OF PRESENT ILLNESS
[FreeTextEntry1] : The colonoscopy to the terminal ileum performed at the [facility] [place] GI endoscopy suite on September 24, 2019 revealed moderate pandiverticulosis that was primarily concentrated to the left colon and small internal hemorrhoids. There were no polyps, masses, AVMs or colitis noted.\par  [de-identified] : The CAT scan of the abdomen and pelvis with IV contrast performed on February 15, 2022 revealed diverticulosis distal descending colon and sigmoid colon without evidence of acute diverticulitis. There are calcifications in the head of the pancreas occasionally in the uncinate process without change which may be due to chronic pancreatitis. \par \par The CAT scan of the abdomen and pelvis with IV contrast performed on June 26, 2013 revealed extensive colonic diverticulosis, minimal haziness in the peritoneal fat adjacent to the distal and mid descending colon that might indicate minimal diverticulitis. Also noted were a few small calcifications in the inferior pancreatic head consistent with old/chronic pancreatitis.

## 2022-12-30 ENCOUNTER — RX RENEWAL (OUTPATIENT)
Age: 62
End: 2022-12-30

## 2023-04-23 ENCOUNTER — NON-APPOINTMENT (OUTPATIENT)
Age: 63
End: 2023-04-23

## 2023-04-24 ENCOUNTER — APPOINTMENT (OUTPATIENT)
Dept: GASTROENTEROLOGY | Facility: CLINIC | Age: 63
End: 2023-04-24
Payer: COMMERCIAL

## 2023-04-24 ENCOUNTER — NON-APPOINTMENT (OUTPATIENT)
Age: 63
End: 2023-04-24

## 2023-04-24 VITALS
WEIGHT: 176 LBS | BODY MASS INDEX: 23.84 KG/M2 | TEMPERATURE: 97.4 F | OXYGEN SATURATION: 98 % | SYSTOLIC BLOOD PRESSURE: 156 MMHG | HEIGHT: 72 IN | DIASTOLIC BLOOD PRESSURE: 91 MMHG | HEART RATE: 128 BPM

## 2023-04-24 DIAGNOSIS — K57.92 DIVERTICULITIS OF INTESTINE, PART UNSPECIFIED, W/OUT PERFORATION OR ABSCESS W/OUT BLEEDING: ICD-10-CM

## 2023-04-24 LAB
ALBUMIN SERPL ELPH-MCNC: 5.2 G/DL
ALP BLD-CCNC: 65 U/L
ALT SERPL-CCNC: 19 U/L
AMYLASE/CREAT SERPL: 62 U/L
ANION GAP SERPL CALC-SCNC: 16 MMOL/L
AST SERPL-CCNC: 21 U/L
BASOPHILS # BLD AUTO: 0.07 K/UL
BASOPHILS NFR BLD AUTO: 0.7 %
BILIRUB SERPL-MCNC: 1.4 MG/DL
BUN SERPL-MCNC: 17 MG/DL
CALCIUM SERPL-MCNC: 10.9 MG/DL
CHLORIDE SERPL-SCNC: 104 MMOL/L
CHOLEST SERPL-MCNC: 136 MG/DL
CO2 SERPL-SCNC: 22 MMOL/L
CREAT SERPL-MCNC: 0.78 MG/DL
CRP SERPL-MCNC: <3 MG/L
EGFR: 102 ML/MIN/1.73M2
EOSINOPHIL # BLD AUTO: 0.02 K/UL
EOSINOPHIL NFR BLD AUTO: 0.2 %
ERYTHROCYTE [SEDIMENTATION RATE] IN BLOOD BY WESTERGREN METHOD: 38 MM/HR
FERRITIN SERPL-MCNC: 61 NG/ML
GGT SERPL-CCNC: 23 U/L
GLUCOSE SERPL-MCNC: 138 MG/DL
HCT VFR BLD CALC: 46.4 %
HDLC SERPL-MCNC: 40 MG/DL
HGB BLD-MCNC: 15.9 G/DL
IMM GRANULOCYTES NFR BLD AUTO: 0.4 %
IRON SATN MFR SERPL: 37 %
IRON SERPL-MCNC: 150 UG/DL
LDLC SERPL CALC-MCNC: 71 MG/DL
LPL SERPL-CCNC: 33 U/L
LYMPHOCYTES # BLD AUTO: 1.67 K/UL
LYMPHOCYTES NFR BLD AUTO: 16.7 %
MAN DIFF?: NORMAL
MCHC RBC-ENTMCNC: 30.2 PG
MCHC RBC-ENTMCNC: 34.3 GM/DL
MCV RBC AUTO: 88 FL
MONOCYTES # BLD AUTO: 0.66 K/UL
MONOCYTES NFR BLD AUTO: 6.6 %
NEUTROPHILS # BLD AUTO: 7.54 K/UL
NEUTROPHILS NFR BLD AUTO: 75.4 %
NONHDLC SERPL-MCNC: 96 MG/DL
PLATELET # BLD AUTO: 292 K/UL
POTASSIUM SERPL-SCNC: 3.9 MMOL/L
PROT SERPL-MCNC: 7.9 G/DL
RBC # BLD: 5.27 M/UL
RBC # FLD: 13.2 %
RHEUMATOID FACT SER QL: <10 IU/ML
SODIUM SERPL-SCNC: 143 MMOL/L
TIBC SERPL-MCNC: 404 UG/DL
TRIGL SERPL-MCNC: 126 MG/DL
TSH SERPL-ACNC: 0.93 UIU/ML
UIBC SERPL-MCNC: 254 UG/DL
WBC # FLD AUTO: 10 K/UL

## 2023-04-24 PROCEDURE — 99214 OFFICE O/P EST MOD 30 MIN: CPT

## 2023-04-24 RX ORDER — DICYCLOMINE HYDROCHLORIDE 10 MG/1
10 CAPSULE ORAL 3 TIMES DAILY
Qty: 90 | Refills: 3 | Status: ACTIVE | COMMUNITY
Start: 2023-04-24 | End: 1900-01-01

## 2023-04-24 RX ORDER — SIMETHICONE 125 MG/1
125 TABLET, CHEWABLE ORAL
Qty: 120 | Refills: 3 | Status: ACTIVE | COMMUNITY
Start: 2023-04-24 | End: 1900-01-01

## 2023-04-24 NOTE — ASSESSMENT
[FreeTextEntry1] : Abdominal Pain: The patient complains of abdominal pain. The patient is to avoid nonsteroidal anti-inflammatory drugs and aspirin.  I recommend a low FOD-MAP diet.  I recommend a trial of Dicyclomine 10 mg tablet PO 3 times a day PRN for the abdominal pain.\par Dyspepsia: The patient complains of dyspeptic symptoms.  The patient was advised to continue to abide by an anti-gas (low FOD-MAP) diet.  The patient was previously given a pamphlet for anti-gas (low FOD-MAP).  The patient and I reviewed the anti-gas (low FOD-MAP)diet at length again. The patient is to continue on a trial of Simethicone one tablet 4 times a day p.r.n. abdominal pain and gas.\par Atypical Chest Pain: The patient complains of atypical chest pain of unclear etiology.  The patient was advised to follow up with the PMD and cardiologist regarding evaluation for the atypical chest pain. The patient was told of possible etiologies such as cardiac, pulmonary, GI, musculoskeletal, stress and other causes for the atypical chest pain.  The patient agrees and will follow-up with the PMD and cardiologist. \par R/O Diverticulitis: The patient has symptoms suggestive of acute diverticulitis. The patient may require an emergency room evaluation for diverticulitis if the symptoms persist. The patient may require treatment with antibiotics if the symptoms persist. The patient may require treatment with a trial of Metronidazole 500 mg 3 times a day and Ciprofloxin 500 mg twice a day for 2 weeks for the presumed acute diverticulitis.     I recommend a low residue diet.  If the symptoms persist, the patient may require a CAT scan of the abdomen and pelvis with IV contrast to assess the acute diverticulitis and possible complications. The patient was advised to go to the hospital if fever, chills, worsening abdominal pain or GI bleeding recurs.  The patient agrees.\par Blood Work: I recommend blood work to assess the patient`s symptoms. I recommend a CBC, SMA 24, amylase, lipase, ESR, TFTs, KIP, rheumatoid factor, celiac sprue panel, IgA, profile for hepatitis A, B, C., iron, TIBC, ferritin level. \par Prior Urgent Care Evaluation: The patient states that he had a bout of diverticulitis. The patient was evaluated at an urgent care center and was treated with metronidazole and ciprofloxacin for possible acute diverticulitis. The patient had just had a CAT scan of the abdomen pelvis with IV contrast to assess the symptoms. The CAT scan of the abdomen and pelvis with IV contrast performed on February 15, 2022 revealed diverticulosis distal descending colon and sigmoid colon without evidence of acute diverticulitis. There are calcifications in the head of the pancreas occasionally in the uncinate process without change which may be due to chronic pancreatitis.\par History of Weight Loss: The patient previously complained of weight loss but denied any anorexia. The patient admitted to losing 7 to 10 pounds over 4 weeks. The anxiety improved on smoking Marijuana, Clonazepam and Lexapro. The patient has recently gained weight.\par Hiatal Hernia: The patient was advised to avoid late-night meals and dietary indiscretions. The patient was advised to avoid fried and fatty foods. The patient was advised to abide by an anti-GERD diet. The patient was given a pamphlet for anti-GERD. The patient and I reviewed the anti-GERD diet at length. I recommend a trial of Pantoprazole 40 mg once a day for the Torres's esophagus.\par Gastritis: The patient has a history of gastritis. The patient is to avoid nonsteroidal anti-inflammatory drugs and aspirin. I recommend a trial of pantoprazole 40 mg once a day for the symptoms and Torres's esophagus. \par Torres’s Esophagus: The patient has a history of Torres’s esophagus on recent upper endoscopy. The patient and I had a long discussion regarding the risk of Torres’s esophagus progressing to esophageal cancer. The patient was told of the importance of acid suppression. The patient was told of the importance of follow-up for repeat endoscopies to assess for dysplasia. The patient agrees and will follow up. I recommend a trial of Pantoprazole 40 mg once a day for the Torres’s esophagus. I recommend a repeat upper endoscopy in 1 year to reassess for Torres’s esophagus and dysplasia unless symptomatic. The patient agreed and will follow up for the procedure.\par Gastric AVMs: The patient had a prior upper endoscopy that revealed nonbleeding gastric AVMs. The gastric AVMs were not cauterized at the time. The patient denies any bright red blood per rectum, melena or hematemesis. If bleeding occurs, the patient may require ablation of a gastric AVM\par Diverticulosis: I recommend a low residue diet and avoid seeds. The patient is to avoid Metamucil or fiber supplementation. The patient is to also consider a trial of a probiotic such as Align once a day. \par Internal Hemorrhoids: The patient is to consider a trial of Anusol H. C. suppositories one per rectum nightly and Anusol HC2.5% cream apply to affected area twice a day p.r.n. hemorrhoidal bleeding or pain. \par History of Colonic Polyps: The patient has a history of colonic polyps. I recommend a repeat colonoscopy in 1 year to reassess for colonic polyps unless symptomatic. The patient agreed and will follow up for the procedure.\par Family History of Colonic Polyps: The patient has a history of colonic polyps. I recommend a repeat colonoscopy in 1 year to reassess for colonic polyps unless symptomatic. The patient agreed and will follow up for the procedure. \par Family History of Colon Cancer: The patient has a family history of colon cancer. I recommend a repeat colonoscopy in 1 year to reassess for colonic polyps unless symptomatic. The patient agreed and will follow up for the procedure. \par Lung Nodule: The patient was again advised to followup with his pulmonologist, Dr. Hayes for the lung nodule. He is aware of the risk of lung cancer and prior exposure to asbestos and knows the importance of followup.\par Follow-up: The patient is to follow-up in the office in 3 months to reassess the symptoms. The patient was told to call the office if any further problems.\par

## 2023-04-24 NOTE — HISTORY OF PRESENT ILLNESS
[FreeTextEntry1] : The colonoscopy to the terminal ileum performed at the [facility] [place] GI endoscopy suite on September 24, 2019 revealed moderate pandiverticulosis that was primarily concentrated to the left colon and small internal hemorrhoids. There were no polyps, masses, AVMs or colitis noted.\par  [de-identified] : The CAT scan of the abdomen and pelvis with IV contrast performed on February 15, 2022 revealed diverticulosis distal descending colon and sigmoid colon without evidence of acute diverticulitis. There are calcifications in the head of the pancreas occasionally in the uncinate process without change which may be due to chronic pancreatitis. \par \par The CAT scan of the abdomen and pelvis with IV contrast performed on June 26, 2013 revealed extensive colonic diverticulosis, minimal haziness in the peritoneal fat adjacent to the distal and mid descending colon that might indicate minimal diverticulitis. Also noted were a few small calcifications in the inferior pancreatic head consistent with old/chronic pancreatitis.

## 2023-04-27 ENCOUNTER — NON-APPOINTMENT (OUTPATIENT)
Age: 63
End: 2023-04-27

## 2023-04-27 LAB
24R-OH-CALCIDIOL SERPL-MCNC: 131 PG/ML
ANA PAT FLD IF-IMP: ABNORMAL
ANA SER IF-ACNC: ABNORMAL
GLIADIN IGA SER QL: 7.4 UNITS
GLIADIN IGG SER QL: <5 UNITS
GLIADIN PEPTIDE IGA SER-ACNC: NEGATIVE
GLIADIN PEPTIDE IGG SER-ACNC: NEGATIVE
HBV CORE IGG+IGM SER QL: REACTIVE
HBV CORE IGM SER QL: NONREACTIVE
HBV E AB SER QL: REACTIVE
HBV E AG SER QL: NONREACTIVE
HBV SURFACE AB SER QL: NONREACTIVE
HBV SURFACE AG SER QL: NONREACTIVE
HCV AB SER QL: NONREACTIVE
HCV S/CO RATIO: 0.1 S/CO
HEPATITIS A IGG ANTIBODY: NONREACTIVE
IGA SER QL IEP: 206 MG/DL
TTG IGA SER IA-ACNC: <1.2 U/ML
TTG IGA SER-ACNC: NEGATIVE
TTG IGG SER IA-ACNC: 2.6 U/ML
TTG IGG SER IA-ACNC: NEGATIVE

## 2023-06-13 ENCOUNTER — EMERGENCY (EMERGENCY)
Facility: HOSPITAL | Age: 63
LOS: 1 days | Discharge: HOME | End: 2023-06-13
Attending: EMERGENCY MEDICINE
Payer: COMMERCIAL

## 2023-06-13 VITALS
TEMPERATURE: 98 F | HEART RATE: 88 BPM | RESPIRATION RATE: 18 BRPM | SYSTOLIC BLOOD PRESSURE: 106 MMHG | DIASTOLIC BLOOD PRESSURE: 73 MMHG | OXYGEN SATURATION: 98 % | WEIGHT: 177.91 LBS | HEIGHT: 72 IN

## 2023-06-13 VITALS
SYSTOLIC BLOOD PRESSURE: 110 MMHG | HEART RATE: 88 BPM | RESPIRATION RATE: 18 BRPM | DIASTOLIC BLOOD PRESSURE: 64 MMHG | TEMPERATURE: 98 F | OXYGEN SATURATION: 98 %

## 2023-06-13 LAB
ALBUMIN SERPL ELPH-MCNC: 4 G/DL — SIGNIFICANT CHANGE UP (ref 3.5–5)
ALP SERPL-CCNC: 59 U/L — SIGNIFICANT CHANGE UP (ref 40–120)
ALT FLD-CCNC: 26 U/L DA — SIGNIFICANT CHANGE UP (ref 10–60)
ANION GAP SERPL CALC-SCNC: 5 MMOL/L — SIGNIFICANT CHANGE UP (ref 5–17)
APPEARANCE UR: CLEAR — SIGNIFICANT CHANGE UP
AST SERPL-CCNC: 13 U/L — SIGNIFICANT CHANGE UP (ref 10–40)
BASOPHILS # BLD AUTO: 0.08 K/UL — SIGNIFICANT CHANGE UP (ref 0–0.2)
BASOPHILS NFR BLD AUTO: 1 % — SIGNIFICANT CHANGE UP (ref 0–2)
BILIRUB SERPL-MCNC: 1.1 MG/DL — SIGNIFICANT CHANGE UP (ref 0.2–1.2)
BILIRUB UR-MCNC: NEGATIVE — SIGNIFICANT CHANGE UP
BUN SERPL-MCNC: 16 MG/DL — SIGNIFICANT CHANGE UP (ref 7–18)
CALCIUM SERPL-MCNC: 9.1 MG/DL — SIGNIFICANT CHANGE UP (ref 8.4–10.5)
CHLORIDE SERPL-SCNC: 110 MMOL/L — HIGH (ref 96–108)
CO2 SERPL-SCNC: 23 MMOL/L — SIGNIFICANT CHANGE UP (ref 22–31)
COLOR SPEC: YELLOW — SIGNIFICANT CHANGE UP
CREAT SERPL-MCNC: 0.79 MG/DL — SIGNIFICANT CHANGE UP (ref 0.5–1.3)
D DIMER BLD IA.RAPID-MCNC: <150 NG/ML DDU — SIGNIFICANT CHANGE UP
DIFF PNL FLD: NEGATIVE — SIGNIFICANT CHANGE UP
EGFR: 102 ML/MIN/1.73M2 — SIGNIFICANT CHANGE UP
EOSINOPHIL # BLD AUTO: 0.14 K/UL — SIGNIFICANT CHANGE UP (ref 0–0.5)
EOSINOPHIL NFR BLD AUTO: 1.7 % — SIGNIFICANT CHANGE UP (ref 0–6)
FLUAV AG NPH QL: SIGNIFICANT CHANGE UP
FLUBV AG NPH QL: SIGNIFICANT CHANGE UP
GLUCOSE SERPL-MCNC: 98 MG/DL — SIGNIFICANT CHANGE UP (ref 70–99)
GLUCOSE UR QL: NEGATIVE — SIGNIFICANT CHANGE UP
HCT VFR BLD CALC: 42.4 % — SIGNIFICANT CHANGE UP (ref 39–50)
HGB BLD-MCNC: 14.3 G/DL — SIGNIFICANT CHANGE UP (ref 13–17)
IMM GRANULOCYTES NFR BLD AUTO: 0.2 % — SIGNIFICANT CHANGE UP (ref 0–0.9)
KETONES UR-MCNC: NEGATIVE — SIGNIFICANT CHANGE UP
LEUKOCYTE ESTERASE UR-ACNC: NEGATIVE — SIGNIFICANT CHANGE UP
LIDOCAIN IGE QN: 132 U/L — SIGNIFICANT CHANGE UP (ref 73–393)
LYMPHOCYTES # BLD AUTO: 2.49 K/UL — SIGNIFICANT CHANGE UP (ref 1–3.3)
LYMPHOCYTES # BLD AUTO: 30.4 % — SIGNIFICANT CHANGE UP (ref 13–44)
MCHC RBC-ENTMCNC: 29.4 PG — SIGNIFICANT CHANGE UP (ref 27–34)
MCHC RBC-ENTMCNC: 33.7 GM/DL — SIGNIFICANT CHANGE UP (ref 32–36)
MCV RBC AUTO: 87.2 FL — SIGNIFICANT CHANGE UP (ref 80–100)
MONOCYTES # BLD AUTO: 0.6 K/UL — SIGNIFICANT CHANGE UP (ref 0–0.9)
MONOCYTES NFR BLD AUTO: 7.3 % — SIGNIFICANT CHANGE UP (ref 2–14)
NEUTROPHILS # BLD AUTO: 4.85 K/UL — SIGNIFICANT CHANGE UP (ref 1.8–7.4)
NEUTROPHILS NFR BLD AUTO: 59.4 % — SIGNIFICANT CHANGE UP (ref 43–77)
NITRITE UR-MCNC: NEGATIVE — SIGNIFICANT CHANGE UP
NRBC # BLD: 0 /100 WBCS — SIGNIFICANT CHANGE UP (ref 0–0)
NT-PROBNP SERPL-SCNC: 12 PG/ML — SIGNIFICANT CHANGE UP (ref 0–125)
PH UR: 5 — SIGNIFICANT CHANGE UP (ref 5–8)
PLATELET # BLD AUTO: 228 K/UL — SIGNIFICANT CHANGE UP (ref 150–400)
POTASSIUM SERPL-MCNC: 3.8 MMOL/L — SIGNIFICANT CHANGE UP (ref 3.5–5.3)
POTASSIUM SERPL-SCNC: 3.8 MMOL/L — SIGNIFICANT CHANGE UP (ref 3.5–5.3)
PROT SERPL-MCNC: 7.9 G/DL — SIGNIFICANT CHANGE UP (ref 6–8.3)
PROT UR-MCNC: 30 MG/DL
RBC # BLD: 4.86 M/UL — SIGNIFICANT CHANGE UP (ref 4.2–5.8)
RBC # FLD: 13 % — SIGNIFICANT CHANGE UP (ref 10.3–14.5)
RBC CASTS # UR COMP ASSIST: NEGATIVE /HPF — SIGNIFICANT CHANGE UP (ref 0–2)
SARS-COV-2 RNA SPEC QL NAA+PROBE: SIGNIFICANT CHANGE UP
SODIUM SERPL-SCNC: 138 MMOL/L — SIGNIFICANT CHANGE UP (ref 135–145)
SP GR SPEC: 1.01 — SIGNIFICANT CHANGE UP (ref 1.01–1.02)
TROPONIN I, HIGH SENSITIVITY RESULT: 5 NG/L — SIGNIFICANT CHANGE UP
TROPONIN I, HIGH SENSITIVITY RESULT: 5 NG/L — SIGNIFICANT CHANGE UP
UROBILINOGEN FLD QL: NEGATIVE — SIGNIFICANT CHANGE UP
WBC # BLD: 8.18 K/UL — SIGNIFICANT CHANGE UP (ref 3.8–10.5)
WBC # FLD AUTO: 8.18 K/UL — SIGNIFICANT CHANGE UP (ref 3.8–10.5)
WBC UR QL: SIGNIFICANT CHANGE UP /HPF (ref 0–5)

## 2023-06-13 PROCEDURE — 93005 ELECTROCARDIOGRAM TRACING: CPT

## 2023-06-13 PROCEDURE — 80053 COMPREHEN METABOLIC PANEL: CPT

## 2023-06-13 PROCEDURE — 71045 X-RAY EXAM CHEST 1 VIEW: CPT | Mod: 26

## 2023-06-13 PROCEDURE — 81001 URINALYSIS AUTO W/SCOPE: CPT

## 2023-06-13 PROCEDURE — 99285 EMERGENCY DEPT VISIT HI MDM: CPT

## 2023-06-13 PROCEDURE — 84484 ASSAY OF TROPONIN QUANT: CPT

## 2023-06-13 PROCEDURE — 85379 FIBRIN DEGRADATION QUANT: CPT

## 2023-06-13 PROCEDURE — 85025 COMPLETE CBC W/AUTO DIFF WBC: CPT

## 2023-06-13 PROCEDURE — 99285 EMERGENCY DEPT VISIT HI MDM: CPT | Mod: 25

## 2023-06-13 PROCEDURE — 71045 X-RAY EXAM CHEST 1 VIEW: CPT

## 2023-06-13 PROCEDURE — 36415 COLL VENOUS BLD VENIPUNCTURE: CPT

## 2023-06-13 PROCEDURE — 87637 SARSCOV2&INF A&B&RSV AMP PRB: CPT

## 2023-06-13 PROCEDURE — 83880 ASSAY OF NATRIURETIC PEPTIDE: CPT

## 2023-06-13 PROCEDURE — 83690 ASSAY OF LIPASE: CPT

## 2023-06-13 NOTE — ED ADULT NURSE NOTE - PAIN: PRESENCE, MLM
Reason For Visit  AURELIANO SOLER is an established patient here today for insulin pump follow up and Dexcom sennnnsor training.  :  services not used.   AURELIANO SOLER was offered a chaperone, but declined. She is accompanied by her family member Daughter.      History of Present Illness  Patient presents today to train on her Dexcom Glucose Sensor.  Patient has no barriers to learning.  Patient finally received her infusion sets and reservoirs 4 of each since she is in need of changing them often due to scar tissue.  Patient is test 4-6 times per day.  Pump settings:  0000-0.725  0800- 0.60  1100- 0.80  1200- 0.90  1600- 0.85  1900- 0.80  I/C  0000-12  0700- 10  1100-11  1430-12  ISF  0000-51  Target - 100-120  Active Insulin Time - 4.0.   Type of Diabetes: Type 1.   Blood Glucose Monitoring: Currently testing blood sugar? Yes. Frequency of Testing: more than four times a day.   Hypoglycemia Awareness/Treatment: Aware of Hypoglycemia? No. Knows how to treat Hypoglycemia? Yes.   Sometimes does not feel her low blood sugars.     Diabetes Education and Treatment: Any previous diabetes education? Yes.   Any previous dietary counseling? Yes.   Currently carb counting? Yes.   Using meal plan? No.   Current Diabetes Treatment Plan: insulin and insulin pump.      Current Meds   1. Accu-Chek FastClix Lancets; use 6 lancets per day;   Therapy: 16Oct2014 to (Evaluate:04Ium8281)  Requested for: 14Mxz2992; Last   Rx:[id number] Ordered   2. ALPRAZolam 0.5 MG Oral Tablet; TAKE 1 TABLET 3 TIMES DAILY;   Therapy: 06Umx9672 to (Evaluate:95Qpo1667); Last Rx:13Pml8754 Ordered   3. AmLODIPine Besylate 10 MG Oral Tablet; TAKE 1 TABLET DAILY;   Therapy: 33Cyw2794 to (Evaluate:13Tzc7122)  Requested for: 28Apb5121; Last   Rx:[id number] Ordered   4. Aspirin 81 MG TABS; TAKE 1 TABLET DAILY; Last Rx:[id number] Ordered   5. Atorvastatin Calcium 40 MG Oral Tablet; TAKE 1 TABLET BY MOUTH AT BEDTIME;   Therapy: 59Jnu0694 to  (Evaluate:27Nvf7060)  Requested for: 69Nyh1724; Last   Rx:[id number] Ordered   6. Pamela Contour Next Test In Vitro Strip; Test 6 times per day;   Therapy: 12Jan2018 to (Evaluate:07Jan2019)  Requested for: 12Jan2018; Last   Rx:12Jan2018 Ordered   7. BusPIRone HCl - 7.5 MG Oral Tablet; TAKE 1 TABLET BY MOUTH TWICE DAILY;   Therapy: 26Jan2018 to (Evaluate:51Dmv3226)  Requested for: 99Cli0658; Last   Rx:[id number] Ordered   8. Calcium 600+D 600-400 MG-UNIT Oral Tablet; TAKE 1 TABLET BY MOUTH TWICE DAILY;   Last Rx:73Dhj1399 Ordered   9. Combivent Respimat  MCG/ACT Inhalation Aerosol Solution; INHALE 1 PUFF 4   TIMES DAILY (MAXIMUM OF 6 PUFFS IN 24 HOURS);   Therapy: 34Qxu8019 to (Last Rx:30Mar2018)  Requested for: 30Mar2018 Ordered   10. Glucagon Emergency 1 MG Injection Kit; USE AS DIRECTED;    Therapy: 12Jan2018 to (Evaluate:95Nzj7435)  Requested for: 12Jan2018; Last    Rx:12Jan2018; Status: ACTIVE - Transmit to Pharmacy - Awaiting Verification Ordered   11. HumaLOG 100 UNIT/ML Subcutaneous Solution; Fill insulin pump to 240 every 3 days;    Therapy: 12Jan2018 to (Evaluate:17Mar2019)  Requested for: 26Apr2018 Recorded   12. Ipratropium-Albuterol 0.5-2.5 (3) MG/3ML Inhalation Solution; USE 1 VIAL IN NEBULIZER    FOUR TIMES DAILY AS NEEDED;    Therapy: 19Tqe2451 to (Evaluate:72Iia6543)  Requested for: 03Taq7770; Last    Rx:[id number] Ordered   13. Ketone Test In Vitro Strip; .  Use 1 when glucose above 250 or symtomatic;    Therapy: 12Jan2018 to (Evaluate:07Jan2019)  Requested for: 12Jan2018; Last    Rx:12Jan2018; Status: ACTIVE - Transmit to Pharmacy - Awaiting Verification Ordered   14. Losartan Potassium 100 MG Oral Tablet; TAKE 1 TABLET BY MOUTH EVERY DAY;    Therapy: 10Oct2013 to (Evaluate:75Qdt8404)  Requested for: 37Wyv2849; Last    Rx:[id number] Ordered   15. Spiriva HandiHaler 18 MCG Inhalation Capsule; INHALE THE CONTENTS OF 1    CAPSULE VIA INHALATION DEVICE DAILY;    Therapy: 09Apr2014 to  (Evaluate:40Oqm8761)  Requested for: 95Hmq0363; Last    Rx:73Diz3122 Ordered   16. Spironolactone 25 MG Oral Tablet; TAKE 0.5 TABLET DAILY;    Therapy: 16Mar2018 to (Evaluate:42Nrx0545)  Requested for: 16Mar2018; Last    Rx:16Mar2018; Status: ACTIVE - Renewal Denied Ordered   17. TraZODone HCl - 50 MG Oral Tablet; TAKE 1 TABLET BY MOUTH EVERY NIGHT AT    BEDTIME;    Therapy: 63Hrv4020 to (Evaluate:78Upj2434)  Requested for: 26Mar2018; Last    Rx:26Mar2018 Ordered    Potential Barriers    None.      Discussion/Summary        Patient comes prepared by bringing all the necessary supplies and she has reviewed the teaching material that come along with her supplies.  Patient has a good understanding on how the Dexcom is to be used for watching trends and not using it for her blood sugar to treat. She is aware that she will need to always check a finger stick and use that blood sugar for treating.    Reviewed with patient how to insert sensor. She was able to demo back and found it relieving that the sensor did not hurt going in.  Patient was instructed on how to initiate her sensor and she returned demo successfully.  She was instructed on how to calibrate and when to do this.    Settings on the :  High blood sugar 270  Low blood sugar 70  Patient states understanding.  Patient to follow-up in 2 weeks. To call if she has any issues either us or Dexcom.  New pump settings:  0000-0.725  0700- 0.575  1100- 0.80  1200- 0.90  1600- 0.85  1900- 0.80  I/C  0000-12  0700- 10  1100-11  1430-12  ISF  0000-51  Target - 100-120  Active Insulin Time - 4.0  Patient states understanding.    .            Time    Total face to face time spent with patient 90 minutes. Greater than 50% of the total time was spent counseling and/or coordinating care.      Signatures   Electronically signed by : RIN BRAUN R.N.; Apr 27 2018  2:23PM CST     complains of pain/discomfort

## 2023-06-13 NOTE — ED ADULT TRIAGE NOTE - CHIEF COMPLAINT QUOTE
c/o chest pains radiating to the Left arm x 2 weeks w/ some trouble breathing , sent from pmd office

## 2023-06-13 NOTE — ED PROVIDER NOTE - OBJECTIVE STATEMENT
62 year old male with a PHMx of hyperlipidemia and pre-diabetes complaining of left-sided chest pain that radiates to the upper epigastric region for the last week. Pain occurred once a day and then spontaneously resolved. Initially did not bother the patient, but now is associated with shortness of breath. No fevers, no chills, no nausea, no vomiting, no diarrhea, no night sweats NKDA. 62 year old male with a PHMx of hyperlipidemia and pre-diabetes complaining of left-sided chest pain that radiates to the upper epigastric region for the last week. Pain occurred once a day and then spontaneously resolves. Initially did not bother the patient, but is associated with intermittent shortness of breath. pt has no CP or dyspnea now. No fevers, no chills, no nausea, no vomiting, no diarrhea, no night sweats NKDA.

## 2023-06-13 NOTE — ED ADULT NURSE NOTE - ED STAT RN HANDOFF DETAILS
Re-evaluated by Dr. Emerson with [facility] teaching and instructions rendered. Patient is stable for discharge. IV access removed and no bleeding noted.

## 2023-06-13 NOTE — ED ADULT NURSE NOTE - NSFALLUNIVINTERV_ED_ALL_ED
Bed/Stretcher in lowest position, wheels locked, appropriate side rails in place/Call bell, personal items and telephone in reach/Instruct patient to call for assistance before getting out of bed/chair/stretcher/Non-slip footwear applied when patient is off stretcher/[place] to call system/Physically safe environment - no spills, clutter or unnecessary equipment/Purposeful proactive rounding/Room/bathroom lighting operational, light cord in reach

## 2023-06-13 NOTE — ED ADULT NURSE NOTE - NS ED NOTE  TALK SOMEONE YN
Occupational Therapy  Facility/Department: [facility] ACUTE REHAB UNIT  Daily Treatment Note  NAME: Brian Harris  : 1972  MRN: [id number]    Date of Service: 2021    Discharge Recommendations:  S Level 3, Home with Home health OT, Home with assist PRN  OT Equipment Recommendations  Equipment Needed: Yes  Mobility Devices: ADL Assistive Devices  ADL Assistive Devices: Reacher;Sock-Aid Hard;Transfer Tub Bench; Toileting - Raised Toilet Seat without arms  Other: cotninue to update/assess pending progress    Assessment   Performance deficits / Impairments: Decreased functional mobility ; Decreased ADL status; Decreased strength;Decreased endurance;Decreased sensation;Decreased high-level IADLs;Decreased coordination;Decreased balance  Assessment: Pt presented with the above deficits impacting his occupational peprformance. Pt is slowly porgressing towards goals- still requiring extensive assist for LB dressing and set-up/SBA for UB. Pt's activity tolerance is slowly improving, able to ambulate with RW at [facility] however requiring 2x assist for sit>stands. Pt's O2 needs are trending down and pt continues to participate in sessions. Pt would benefit from ongoing intensive therapy services and extended stay in order to maximize her independence and safety with all occupational pursuits. Continue POC  Treatment Diagnosis: decreased ADL/IADL/mobility/activity tolerance due to CABG  Prognosis: Good  OT Education: OT Role;Plan of Care;ADL Adaptive Strategies;Transfer Training;Precautions  Patient Education: continue to reinforce for carryover  REQUIRES OT FOLLOW UP: Yes  Activity Tolerance  Activity Tolerance: Patient Tolerated treatment well;Patient limited by fatigue  Activity Tolerance: Pt on RA throughout session- SpO2 90% and above throughout session, multiple rest breaks required throughout  [address] [place] [address] in place: Yes  Type of devices: Left in chair; All fall risk precautions in place;Call light within reach;Chair alarm in place         Patient Diagnosis(es): CABG, debility      has a past medical history of Diabetes mellitus ([facility] [address] End stage kidney disease ([address] [address] Hypertension, Neuropathy, and Peripheral vascular disease ([facility] [address] has a past surgical history that includes Tonsillectomy;  section; other surgical history (2018); Toe amputation (Left, 2021); Toe amputation (Left, 2021); and Coronary artery bypass graft (N/A, 2021). Restrictions  Restrictions/Precautions  Restrictions/Precautions: Fall Risk, Weight Bearing  Required Braces or Orthoses?: Yes  Lower Extremity Weight Bearing Restrictions  Left Lower Extremity Weight Bearing: Weight Bearing As Tolerated  Required Braces or Orthoses  Left Lower Extremity Brace: Boot  LLE Brace Type: Surgical shoe  Position Activity Restriction  Sternal Precautions: No Pushing, No Pulling, 5# Lifting Restrictions  Other position/activity restrictions: s/p CABG x 3 Patient is a 54year old female with a history of ESRD on PD, HTN, DM, HLD, and PVD. On 21 she had amputation of toes 1 and 2 on the left foot. On 21 she underwent a left popliteal, anterior tibial and dorsalis pedis angioplasty with Dr. Gena Moser. She presented to the ER on 21 with a syncopal episode. She has been experiencing dizziness and syncopal episodes X2 weeks prior to this visit. She underwent a Left Heart Catheterization on 21 and was found to have severe coronary microvascular disease.  On 21 she underwent a CABG X3 with NYA clip    Subjective   General  Chart Reviewed: Yes  Patient assessed for rehabilitation services?: Yes  Additional Pertinent Hx: DM - peritoneal dialysis; s/p amputation of the 1st and 2nd toes of the left foot (21)  Response to previous treatment: Patient with no complaints from previous session  Family / Caregiver Present: No  Referring Practitioner: Patric العلي MD  Diagnosis: CABG x 3   Subjective  Subjective: Pt seated in w/c at entry, agreeable to cotx session, requesting to shower during PM session  General Comment  Comments: Chio Coleman Vital Signs  Patient Currently in Pain: Yes          Objective    ADL  Additional Comments: TD to don ortho shoe and shoe seated w/c level- all other ADL needs declined           Balance  Sitting Balance: Supervision  Standing Balance: Contact guard assistance  Standing Balance  Time: 7-8min total  Activity: functional mobility/transfers, stairs, toe taps on step  Comment: Pt completed 10 reps of alternating toe taps on 6\" step and lateral full foot taps with R and L    Functional Mobility  Functional - Mobility Device: Rolling Walker  Activity: Other  Assist Level: Contact guard assistance  Functional Mobility Comments: ~193 ft room to gym with close w/c follow for safety       Transfers  Sit to stand: 2 Person assistance  Stand to sit: Moderate assistance;Minimal assistance  Transfer Comments: Mod-MaxA of 2 from w/c; Min-ModA from EOM x5          Second Session:  Pt seated in recliner at entry, agreeable to cotx session and shower. Denied pain. SpO2 at rest 94% on RA- pt on room air throughout session, post shower SpO2 level of 96%. Pt ambulated to bathroom with RW at [place] [address]. Toileting completed prior to shower with CGA for clothing management- pt required TD (assist x3)  for safe sit>stand from commode. UB/LB bathing/dressing completed seated on TTB- CGA for clothing over hips, MaxA for socks/shoes and ortho boot, pt able to thread LEs into pants/undergarments with ModA, set-up/SUP for UB bathing/dressing, [place] for LB bathing with assist to thoroughly dry LEs and posterior rula- increased time for all ADL completion. Pt required ModA for mobility with RW from shower chair to recliner. ModA of 2 for transfers from shower chair and recliner. Pt requesting to lay in bed at EOS- sit>supine with Min/CGA. Pt left supine in bed, bed alarm on, call light and needs in reach.                      Plan Plan  Times per week: 90 minutes; 5-6x/wk  Times per day: Daily  Current Treatment Recommendations: Strengthening, ROM, Balance Training, Functional Mobility Training, Endurance Training, Safety Education & Training, Patient/Caregiver Education & Training, Equipment Evaluation, Education, & procurement, Positioning, Self-Care / ADL, Pain Management, Home Management Training             Goals  Short term goals  Time Frame for Short term goals: 1-2weeks  Short term goal 1: Pt will complete UB/LB dressing with mod I and use fo AE as needed  Short term goal 2: Pt will complete bathing with mod I  Short term goal 3: Pt will complete toileting with mod I  Short term goal 4: Pt will complete functional transfers with mod I  Short term goal 5: Pt will tolerate x10 mins of standing IADLs with SpO2 above 90% in order to increase activity tolerance for d/c home  Long term goals  Time Frame for Long term goals : LTG=STG  Patient Goals   Patient goals : to return home       Therapy Time   Individual Concurrent Group Co-treatment   Time In       0830, 1330   Time Out       0915, 1423   Minutes       45, 53     Timed Code Treatment Minutes:  45 +53  Total Treatment Minutes:  1100 Kirsten Littlejohn, Vincent Bloom 100 [facility] [address] No

## 2023-06-13 NOTE — ED ADULT NURSE NOTE - OBJECTIVE STATEMENT
Patient reported of left sided chest pain radiating to left arm for weeks, denies any dizziness, n/v.

## 2023-06-13 NOTE — ED PROVIDER NOTE - PATIENT PORTAL LINK FT
You can access the FollowMyHealth Patient Portal offered by [facility] by registering at the following website: [email or web address]. By joining [facility]’s FollowMyHealth portal, you will also be able to view your health information using other applications (apps) compatible with our system.

## 2023-06-13 NOTE — ED PROVIDER NOTE - PHYSICAL EXAMINATION
General: pt lying in stretcher, appears stated age and is not in distress  HEENT: AT/NC, pink conjunctiva, anicteric sclerae, EOMI, PERRLA, TMs smooth, grey, intact, with normal landmarks, nasal mucosa pink, no discharge, turbinates not enlarged; moist mucus membranes, tongue well-papillated, good dentition; posterior pharynx shows no erythema or exudates;   Neck: supple, full ROM, trachea midline, no JVD, no cervical LAD, no midline ttp or stepoffs  Lungs: symmetric excursion, b/l clear vesicular breath sounds with no wheezes, crackles, or rhonchi  Heart: rrr, S1, S2 normal; no S3 or S4; no murmurs or rubs  Abd: normal bowel sounds; soft, nontender; negative McBurney's point tenderness, negative Ochoa's sign, no rebound, no guarding, spleen non-palpable; no hepatomegaly, no masses  Back: no midline spinal tenderness or stepoffs, no costovertebral angle tenderness  Extremities: no clubbing, cyanosis, or edema; no palpable deformities or fractures  Skin: good turgor; no rashes, petechiae, ecchymoses, or jaundice  Pulses: radial, posterior tibialis (PT), dorsalis pedis (DP) all 2+ & symmetric  Neuro: awake, alert, responsive; oriented to person, place and time; cranial nerves intact, EOMI, intact jaw movement, intact facial sensation, no facial asymmetry, hearing intact; no nystagmus, tongue midline; Motor: Normal tone in upper and lower extremities bilaterally strength 5/5; Sensory: intact to pinprick and light touch; Cerebellar: finger-to-nose intact; normal steady gait; negative Romberg’s sign; DTR: biceps, triceps, patellar, 2+, no pronator drift General: pt lying in stretcher, appears stated age and is not in distress, speaking in full clear sentences  HEENT: AT/NC, pink conjunctiva, anicteric sclerae, EOMI, mmm  Neck: supple, full ROM, trachea midline, no JVD, no cervical LAD, no midline ttp or stepoffs  Lungs: symmetric excursion, b/l clear vesicular breath sounds with no wheezes, crackles, or rhonchi  Heart: rrr, S1, S2 normal; no S3 or S4; no murmurs or rubs  Abd: normal bowel sounds; soft, nontender; negative McBurney's point tenderness, negative Ochoa's sign, no rebound, no guarding, spleen non-palpable; no hepatomegaly, no masses  Back: no midline spinal tenderness or stepoffs, no costovertebral angle tenderness  Extremities: no clubbing, cyanosis, or edema; no palpable deformities or fractures  Skin: good turgor; no rashes, petechiae, ecchymoses, or jaundice  Pulses: radial, posterior tibialis (PT), dorsalis pedis (DP) all 2+ & symmetric  Neuro: awake, alert, responsive; oriented to person, place and time; cranial nerves intact, EOMI, intact jaw movement, intact facial sensation, no facial asymmetry, hearing intact; no nystagmus, tongue midline; Motor: Normal tone in upper and lower extremities bilaterally; Sensory: intact; normal steady gait;

## 2023-06-13 NOTE — ED ADULT TRIAGE NOTE - MEANS OF ARRIVAL
1700 Spoke with Kai Cartwright CNM regarding tylenol. Orders received for 650 mg PO tylenol. 1733 IV started. Fluids infusing. Kai Cartwright CNM cleared patient to eat. 1735 Patient sitting up to eat. 1827 Patient reports no change to pain. Patient resting comfortably. 500 mL of fluids infused at this time. Patient's arm repositioned for IV fluids to infuse. [facility] SARAH Jorge aware that patient's pain remains unchanged and fluid amount infused. Orders received to sent GC on patient's urine. Urine sent to lab. 1913 Verbal shift change report given to ERICA Acosta RN (oncoming nurse) by "[employer] PHILL Wade (offgoing nurse). Report included the following information SBAR, Kardex, Intake/Output, MAR and Recent Results. ambulatory

## 2023-07-24 ENCOUNTER — APPOINTMENT (OUTPATIENT)
Dept: GASTROENTEROLOGY | Facility: CLINIC | Age: 63
End: 2023-07-24
Payer: COMMERCIAL

## 2023-07-24 VITALS
BODY MASS INDEX: 22.21 KG/M2 | TEMPERATURE: 97 F | DIASTOLIC BLOOD PRESSURE: 79 MMHG | WEIGHT: 164 LBS | OXYGEN SATURATION: 96 % | HEART RATE: 101 BPM | SYSTOLIC BLOOD PRESSURE: 130 MMHG | HEIGHT: 72 IN

## 2023-07-24 DIAGNOSIS — R07.89 OTHER CHEST PAIN: ICD-10-CM

## 2023-07-24 DIAGNOSIS — K31.819 ANGIODYSPLASIA OF STOMACH AND DUODENUM W/OUT BLEEDING: ICD-10-CM

## 2023-07-24 DIAGNOSIS — K21.9 GASTRO-ESOPHAGEAL REFLUX DISEASE W/OUT ESOPHAGITIS: ICD-10-CM

## 2023-07-24 PROBLEM — E78.5 HYPERLIPIDEMIA, UNSPECIFIED: Chronic | Status: ACTIVE | Noted: 2023-06-13

## 2023-07-24 PROBLEM — R73.03 PREDIABETES: Chronic | Status: ACTIVE | Noted: 2023-06-13

## 2023-07-24 PROCEDURE — 99213 OFFICE O/P EST LOW 20 MIN: CPT

## 2023-07-24 RX ORDER — PANTOPRAZOLE 40 MG/1
40 TABLET, DELAYED RELEASE ORAL DAILY
Qty: 30 | Refills: 2 | Status: ACTIVE | COMMUNITY
Start: 2023-07-24 | End: 1900-01-01

## 2023-07-24 NOTE — ASSESSMENT
[FreeTextEntry1] : GERD: The patient was advised to avoid late-night meals and dietary indiscretions.  The patient was advised to avoid fried and fatty foods.  The patient was advised to abide by an anti-GERD diet. The patient was given a pamphlet for anti-GERD.  The patient and I reviewed the anti-GERD diet at length. I recommend a trial of Pantoprazole 40 mg once a day x 3 months for the symptoms.\par Diarrhea: The patient complains of diarrhea.  I recommend a low residue diet. The patient is to avoid fiber supplementation. The patient is to consider starting a trial of a probiotic such as Align once a day.   If the symptoms persist, the patient may requiresending stool studies for C+S, O+P x3, and C. difficile to assess for an infectious etiology of the diarrhea.  The symptoms are worse after meals.  The patient has a history of cholecystectomy.  I recommend a trial of cholestyramine one packet once a day for possible bile induced diarrhea. If the symptoms persist, the patient may require a colonoscopy to assess for colitis versus other causes.  The patient was told of the risks and benefits of the procedure.  The patient was told of the risks of perforation, emergency surgery, bleeding, infections and missed lesions.  The patient agreed and will follow-up to reassess the symptoms.  \par History of Diverticulitis: The patient has symptoms suggestive of acute diverticulitis. The patient may require an emergency room evaluation for diverticulitis if the symptoms persist. The patient may require treatment with antibiotics if the symptoms persist. The patient may require treatment with a trial of Metronidazole 500 mg 3 times a day and Ciprofloxin 500 mg twice a day for 2 weeks for the presumed acute diverticulitis. I recommend a low residue diet. If the symptoms persist, the patient may require a CAT scan of the abdomen and pelvis with IV contrast to assess the acute diverticulitis and possible complications. The patient was advised to go to the hospital if fever, chills, worsening abdominal pain or GI bleeding recurs. The patient agrees.\par Prior Urgent Care Evaluation: The patient states that he had a bout of diverticulitis. The patient was evaluated at an urgent care center and was treated with metronidazole and ciprofloxacin for possible acute diverticulitis. The patient had just had a CAT scan of the abdomen pelvis with IV contrast to assess the symptoms. The CAT scan of the abdomen and pelvis with IV contrast performed on February 15, 2022 revealed diverticulosis distal descending colon and sigmoid colon without evidence of acute diverticulitis. There are calcifications in the head of the pancreas occasionally in the uncinate process without change which may be due to chronic pancreatitis.\par History of Weight Loss: The patient complains of weight loss and anorexia.  The patient admits to losing 9 pounds over the past 4 to 6 months. The patient attributes the weight loss to change in diet, anxiety and stress.   The patient is being followed by his psychiatrist.  The patient’s medications were changed with improvement of the symptoms.  The patient is currently on Lorazepam, Resperidone and increased Sertraline. \par Hiatal Hernia: The patient was advised to avoid late-night meals and dietary indiscretions. The patient was advised to avoid fried and fatty foods. The patient was advised to abide by an anti-GERD diet. The patient was given a pamphlet for anti-GERD. The patient and I reviewed the anti-GERD diet at length. I recommend a trial of Pantoprazole 40 mg once a day for the Torres's esophagus.\par Gastritis: The patient has a history of gastritis. The patient is to avoid nonsteroidal anti-inflammatory drugs and aspirin. I recommend a trial of pantoprazole 40 mg once a day for the symptoms and Torres's esophagus. \par Torres’s Esophagus: The patient has a history of Torres’s esophagus on recent upper endoscopy. The patient and I had a long discussion regarding the risk of Torres’s esophagus progressing to esophageal cancer. The patient was told of the importance of acid suppression. The patient was told of the importance of follow-up for repeat endoscopies to assess for dysplasia. The patient agrees and will follow up. I recommend a trial of Pantoprazole 40 mg once a day for the Torres’s esophagus. I recommend a repeat upper endoscopy in 1 year to reassess for Torres’s esophagus and dysplasia unless symptomatic. The patient agreed and will follow up for the procedure.\par Gastric AVMs: The patient had a prior upper endoscopy that revealed nonbleeding gastric AVMs. The gastric AVMs were not cauterized at the time. The patient denies any bright red blood per rectum, melena or hematemesis. If bleeding occurs, the patient may require ablation of a gastric AVM\par Diverticulosis: I recommend a low residue diet and avoid seeds. The patient is to avoid Metamucil or fiber supplementation. The patient is to also consider a trial of a probiotic such as Align once a day. \par Internal Hemorrhoids: The patient is to consider a trial of Anusol H. C. suppositories one per rectum nightly and Anusol HC2.5% cream apply to affected area twice a day p.r.n. hemorrhoidal bleeding or pain. \par History of Colonic Polyps: The patient has a history of colonic polyps. I recommend a repeat colonoscopy in 1 year to reassess for colonic polyps unless symptomatic. The patient agreed and will follow up for the procedure.\par Family History of Colonic Polyps: The patient has a history of colonic polyps. I recommend a repeat colonoscopy in 1 year to reassess for colonic polyps unless symptomatic. The patient agreed and will follow up for the procedure. \par Family History of Colon Cancer: The patient has a family history of colon cancer. I recommend a repeat colonoscopy in 1 year to reassess for colonic polyps unless symptomatic. The patient agreed and will follow up for the procedure. \par Lung Nodule: The patient was again advised to followup with his pulmonologist, Dr. Hayes for the lung nodule. He is aware of the risk of lung cancer and prior exposure to asbestos and knows the importance of followup. The patient is going for his annual CT of the chest without IV contrast in 1 week to reassess the stability of the lung nodule.\par Prior ER Evaluation:The patient was previously evaluated at the [facility] emergency room for chest pain.   The patient had blood work and imaging studies to assess the symptoms.  I reviewed the blood work and imaging studies performed in the emergency room.\par Follow-up: The patient is to follow-up in the office in 6 months to reassess the symptoms. The patient was told to call the office if any further problems.\par \par \par

## 2023-07-24 NOTE — HISTORY OF PRESENT ILLNESS
[FreeTextEntry1] : The colonoscopy to the terminal ileum performed at the [facility] GI endoscopy suite on September 24, 2019 revealed moderate pandiverticulosis that was primarily concentrated to the left colon and small internal hemorrhoids. There were no polyps, masses, AVMs or colitis noted.\par  [de-identified] : The CAT scan of the abdomen and pelvis with IV contrast performed on February 15, 2022 revealed diverticulosis distal descending colon and sigmoid colon without evidence of acute diverticulitis. There are calcifications in the head of the pancreas occasionally in the uncinate process without change which may be due to chronic pancreatitis. \par \par The CAT scan of the abdomen and pelvis with IV contrast performed on June 26, 2013 revealed extensive colonic diverticulosis, minimal haziness in the peritoneal fat adjacent to the distal and mid descending colon that might indicate minimal diverticulitis. Also noted were a few small calcifications in the inferior pancreatic head consistent with old/chronic pancreatitis.

## 2023-08-24 ENCOUNTER — NON-APPOINTMENT (OUTPATIENT)
Age: 63
End: 2023-08-24

## 2023-08-25 ENCOUNTER — APPOINTMENT (OUTPATIENT)
Dept: GASTROENTEROLOGY | Facility: CLINIC | Age: 63
End: 2023-08-25
Payer: COMMERCIAL

## 2023-08-25 VITALS
HEART RATE: 87 BPM | BODY MASS INDEX: 22.48 KG/M2 | SYSTOLIC BLOOD PRESSURE: 110 MMHG | OXYGEN SATURATION: 96 % | WEIGHT: 166 LBS | HEIGHT: 72 IN | DIASTOLIC BLOOD PRESSURE: 71 MMHG | TEMPERATURE: 97.2 F

## 2023-08-25 DIAGNOSIS — Z87.19 PERSONAL HISTORY OF OTHER DISEASES OF THE DIGESTIVE SYSTEM: ICD-10-CM

## 2023-08-25 DIAGNOSIS — K64.8 OTHER HEMORRHOIDS: ICD-10-CM

## 2023-08-25 DIAGNOSIS — R10.32 LEFT LOWER QUADRANT PAIN: ICD-10-CM

## 2023-08-25 PROCEDURE — 99214 OFFICE O/P EST MOD 30 MIN: CPT

## 2023-08-25 RX ORDER — SIMETHICONE 125 MG/1
125 TABLET, CHEWABLE ORAL
Qty: 120 | Refills: 3 | Status: ACTIVE | COMMUNITY
Start: 2023-08-25 | End: 1900-01-01

## 2023-08-25 RX ORDER — DICYCLOMINE HYDROCHLORIDE 20 MG/1
20 TABLET ORAL 4 TIMES DAILY
Qty: 120 | Refills: 3 | Status: ACTIVE | COMMUNITY
Start: 2023-08-25 | End: 1900-01-01

## 2023-08-25 NOTE — ASSESSMENT
[FreeTextEntry1] : Abdominal Pain: The patient complains of abdominal pain. The patient is to avoid nonsteroidal anti-inflammatory drugs and aspirin.  I recommend a low FOD-MAP diet.  I recommend a trial of Dicyclomine 10 mg tablet PO 3 times a day PRN for the abdominal pain. Dyspepsia: The patient complains of dyspeptic symptoms.  The patient was advised to continue to abide by an anti-gas (low FOD-MAP) diet.  The patient was previously given a pamphlet for anti-gas (low FOD-MAP).  The patient and I reviewed the anti-gas (low FOD-MAP) diet at length again. The patient is to continue on a trial of Simethicone one tablet 4 times a day p.r.n. abdominal pain and gas. Diarrhea: The patient complains of diarrhea.  I recommend a low residue diet. The patient is to avoid fiber supplementation. The patient is to consider starting a trial of a probiotic such as Align once a day.   I recommend sending stool studies for C+S, GI PCR, stool lactoferrin and fecal calprotectin and C. difficile to assess for an infectious etiology of the diarrhea.  The symptoms are worse after meals.  The patient has a history of cholecystectomy.  I recommend a trial of cholestyramine one packet once a day for possible bile induced diarrhea. If the symptoms persist, the patient may require a colonoscopy to assess for colitis versus other causes.  The patient was told of the risks and benefits of the procedure.  The patient was told of the risks of perforation, emergency surgery, bleeding, infections and missed lesions.  The patient agreed and will follow-up to reassess the symptoms.   History of Diverticulitis: The patient has symptoms suggestive of acute diverticulitis. The patient may require an emergency room evaluation for diverticulitis if the symptoms persist. The patient may require treatment with antibiotics if the symptoms persist. The patient may require treatment with a trial of Metronidazole 500 mg 3 times a day and Ciprofloxin 500 mg twice a day for 2 weeks for the presumed acute diverticulitis. I recommend a low residue diet. If the symptoms persist, the patient may require a CAT scan of the abdomen and pelvis with IV contrast to assess the acute diverticulitis and possible complications. The patient was advised to go to the hospital if fever, chills, worsening abdominal pain or GI bleeding recurs. The patient agrees. Prior Urgent Care Evaluation: The patient states that he had a bout of diverticulitis. The patient was evaluated at an urgent care center and was treated with metronidazole and ciprofloxacin for possible acute diverticulitis. The patient had just had a CAT scan of the abdomen pelvis with IV contrast to assess the symptoms. The CAT scan of the abdomen and pelvis with IV contrast performed on February 15, 2022 revealed diverticulosis distal descending colon and sigmoid colon without evidence of acute diverticulitis. There are calcifications in the head of the pancreas occasionally in the uncinate process without change which may be due to chronic pancreatitis. History of Weight Loss: The patient complains of weight loss and anorexia. The patient admits to losing 9 pounds over the past 4 to 6 months. The patient attributes the weight loss to change in diet, anxiety and stress. The patient is being followed by his psychiatrist. The patient's medications were changed with improvement of the symptoms. The patient is currently on Lorazepam, Resperidone and increased Sertraline.  The patient currently gained weight. Hiatal Hernia: The patient was advised to avoid late-night meals and dietary indiscretions. The patient was advised to avoid fried and fatty foods. The patient was advised to abide by an anti-GERD diet. The patient was given a pamphlet for anti-GERD. The patient and I reviewed the anti-GERD diet at length. I recommend a trial of Pantoprazole 40 mg once a day for the Torres's esophagus. Gastritis: The patient has a history of gastritis. The patient is to avoid nonsteroidal anti-inflammatory drugs and aspirin. I recommend a trial of pantoprazole 40 mg once a day for the symptoms and Torres's esophagus. Torres's Esophagus: The patient has a history of Torres's esophagus on recent upper endoscopy. The patient and I had a long discussion regarding the risk of Torres's esophagus progressing to esophageal cancer. The patient was told of the importance of acid suppression. The patient was told of the importance of follow-up for repeat endoscopies to assess for dysplasia. The patient agrees and will follow up. I recommend a trial of Pantoprazole 40 mg once a day for the Torres's esophagus. I recommend a repeat upper endoscopy in 1 year to reassess for Torres's esophagus and dysplasia unless symptomatic. The patient agreed and will follow up for the procedure. Gastric AVMs: The patient had a prior upper endoscopy that revealed nonbleeding gastric AVMs. The gastric AVMs were not cauterized at the time. The patient denies any bright red blood per rectum, melena or hematemesis. If bleeding occurs, the patient may require ablation of a gastric AVM Diverticulosis: I recommend a low residue diet and avoid seeds. The patient is to avoid Metamucil or fiber supplementation. The patient is to also consider a trial of a probiotic such as Align once a day. Internal Hemorrhoids: The patient is to consider a trial of Anusol H. C. suppositories one per rectum nightly and Anusol HC2.5% cream apply to affected area twice a day p.r.n. hemorrhoidal bleeding or pain. History of Colonic Polyps: The patient has a history of colonic polyps. I recommend a repeat colonoscopy in 1 year to reassess for colonic polyps unless symptomatic. The patient agreed and will follow up for the procedure. Family History of Colonic Polyps: The patient has a history of colonic polyps. I recommend a repeat colonoscopy in 1 year to reassess for colonic polyps unless symptomatic. The patient agreed and will follow up for the procedure. Family History of Colon Cancer: The patient has a family history of colon cancer. I recommend a repeat colonoscopy in 1 year to reassess for colonic polyps unless symptomatic. The patient agreed and will follow up for the procedure. Lung Nodule: The patient was again advised to followup with his pulmonologist, Dr. Hayes for the lung nodule. He is aware of the risk of lung cancer and prior exposure to asbestos and knows the importance of followup. The patient is going for his annual CT of the chest without IV contrast in 1 week to reassess the stability of the lung nodule. Prior ER Evaluation: The patient was previously evaluated [place] the [facility] [place] for chest pain. The patient had blood work and imaging studies to assess the symptoms. I reviewed the blood work and imaging studies performed in the emergency room. Follow-up: The patient is to follow-up in the office in 3 months to reassess the symptoms. The patient was told to call the office if any further problems.

## 2023-08-25 NOTE — HISTORY OF PRESENT ILLNESS
[FreeTextEntry1] : The colonoscopy to the terminal ileum performed at the [facility] [place] GI endoscopy suite on September 24, 2019 revealed moderate pandiverticulosis that was primarily concentrated to the left colon and small internal hemorrhoids. There were no polyps, masses, AVMs or colitis noted.\par   [de-identified] : The CAT scan of the abdomen and pelvis with IV contrast performed on February 15, 2022 revealed diverticulosis distal descending colon and sigmoid colon without evidence of acute diverticulitis. There are calcifications in the head of the pancreas occasionally in the uncinate process without change which may be due to chronic pancreatitis. \par  \par  The CAT scan of the abdomen and pelvis with IV contrast performed on June 26, 2013 revealed extensive colonic diverticulosis, minimal haziness in the peritoneal fat adjacent to the distal and mid descending colon that might indicate minimal diverticulitis. Also noted were a few small calcifications in the inferior pancreatic head consistent with old/chronic pancreatitis.

## 2023-08-26 RX ORDER — PANTOPRAZOLE 40 MG/1
40 TABLET, DELAYED RELEASE ORAL DAILY
Qty: 30 | Refills: 3 | Status: ACTIVE | COMMUNITY
Start: 2023-08-26 | End: 1900-01-01

## 2023-08-28 ENCOUNTER — NON-APPOINTMENT (OUTPATIENT)
Age: 63
End: 2023-08-28

## 2023-08-29 ENCOUNTER — NON-APPOINTMENT (OUTPATIENT)
Age: 63
End: 2023-08-29

## 2023-08-29 LAB
ALBUMIN SERPL ELPH-MCNC: 5.1 G/DL
ALP BLD-CCNC: 75 U/L
ALT SERPL-CCNC: 20 U/L
AMYLASE/CREAT SERPL: 63 U/L
ANION GAP SERPL CALC-SCNC: 14 MMOL/L
AST SERPL-CCNC: 15 U/L
BILIRUB SERPL-MCNC: 1.5 MG/DL
BUN SERPL-MCNC: 20 MG/DL
CALCIUM SERPL-MCNC: 10.1 MG/DL
CHLORIDE SERPL-SCNC: 100 MMOL/L
CO2 SERPL-SCNC: 22 MMOL/L
CREAT SERPL-MCNC: 0.87 MG/DL
CRP SERPL-MCNC: <3 MG/L
EGFR: 99 ML/MIN/1.73M2
ENTEROPATHOGENIC E. COLI (EPEC): DETECTED
ERYTHROCYTE [SEDIMENTATION RATE] IN BLOOD BY WESTERGREN METHOD: 28 MM/HR
GGT SERPL-CCNC: 30 U/L
GI PCR PANEL: DETECTED
GLUCOSE SERPL-MCNC: 176 MG/DL
LPL SERPL-CCNC: 33 U/L
POTASSIUM SERPL-SCNC: 4.6 MMOL/L
PROT SERPL-MCNC: 7.7 G/DL
SODIUM SERPL-SCNC: 136 MMOL/L

## 2023-08-30 ENCOUNTER — NON-APPOINTMENT (OUTPATIENT)
Age: 63
End: 2023-08-30

## 2023-08-30 LAB
C DIFF TOXIN B QL PCR REFLEX: NORMAL
GDH ANTIGEN: NOT DETECTED
TOXIN A AND B: NOT DETECTED

## 2023-09-01 LAB — CALPROTECTIN FECAL: 75 UG/G

## 2023-09-05 LAB — LACTOFERRIN STL-MCNC: <1 CD:794062635

## 2023-11-15 NOTE — ED PROVIDER NOTE - CLINICAL SUMMARY MEDICAL DECISION MAKING FREE TEXT BOX
Luz Peters NP patient 10-26-23      HISTORY OF PRESENT ILLNESS:     Carlene Christie is a 27 year old female who presents today for a follow up on hair loss and positive thyroid antibody test.     Narrative & Impression  EXAM: US THYROID     CLINICAL INFORMATION: Other specified abnormal immunological findings in  serum     TECHNIQUE: Multiple grayscale and color Doppler images of the thyroid.  Imaging included the tissue immediately surrounding the thyroid gland.     COMPARISON: None.     FINDINGS:     Right Thyroid Lobe:     Size: 4.3 x 1.3 x 1.1 cm.     Echotexture/Vascularity: Normal.     Left Thyroid Lobe:     Size: 4.2 x 1.7 x 1.1 cm.     Echotexture/Vascularity: Normal.     Isthmus: 0.3 cm.     NODULES:  Based on TI-RADS criteria, only the 4 nodules most significant on  ultrasound will be described.  ------------------------------     Nodule 1:       Label on Images: 1       Location: right mid       Size: 1 x 0.7 x 0.5 cm, prior None.       Composition: solid/almost completely solid (2)       Echogenicity: isoechoic (1)       Shape: not taller-than-wide (0)       Margins: smooth (0)        Echogenic foci: None. (0)          ACR TI-RADS total points: 3       ACR TI-RADS risk category: TR3 (3 Points)     ------------------------------------------     Abnormal lymph nodes: None.  Extrathyroidal / parathyroid mass: None.  ---------------------------------     ACR TI-RADS recommendations:  TR5 (>= 7 points) - FNA if >= 1 cm, follow-up if 0.5-0.9 cm every year for  5 years  TR4 (4-6 points) - FNA if >= 1.5 cm, follow-up if 1-1.4 cm in 1, 2, 3 and 5  years  TR3 (3 points) - FNA if >= 2.5 cm, follow-up if 1.5-2.4 cm in 1, 3 and 5  years  TR2 (2 points) and TR1 (0 points) - No FNA or follow-up     * ACR TI-RADS recommends that no more than two nodules with the highest ACR  TI-RADS total point should be biopsied and no more than four nodules should  be followed.     *Clinically significant interval growth 
defined as >20% increase size in  two nodule dimension and the change in size must be greater than or equal  to 2mm.     CONNIE Head et al, 'ACR Thyroid Imaging, Reporting, and Data System  (TI-RADS): White Paper of the ACR TI-RADS Committee', March 2017.     ------------------------------------------        IMPRESSION: Homogeneous appearance of the thyroid with a single small  nodule.     Nodules meeting the TI-RADS criteria for FNA: None.  Nodules meeting the TI-RADS criteria for one year follow up: None.     Electronically Signed by: Jami Salmon MD  Signed on: 11/13/2023 11:22 AM  Component      Latest Ref Rng 10/12/2023  2:24 PM 10/16/2023  1:15 PM   Thyroglobulin      1.2 - 35.0 ng/mL     Anti Thyroglobulin      0.0 - 4.0 IU/mL     TRIIODOTHYRONINE, FREE      2.2 - 4.0 pg/mL 2.5     T4, FREE      0.8 - 1.5 ng/dL 0.9     TSH      0.350 - 5.000 mcUnits/mL  1.841    THYROID PEROXIDASE AB      <=60 Units/mL  105 (H)       Legend:  (H) High    Please advise   
denies pain/discomfort
Pt w/ aforementioned presentation concerning for but not limited to __   Will get labs, imaging, treat symptoms, monitor and reassess.

## 2024-01-24 ENCOUNTER — APPOINTMENT (OUTPATIENT)
Dept: GASTROENTEROLOGY | Facility: CLINIC | Age: 64
End: 2024-01-24
Payer: COMMERCIAL

## 2024-01-24 VITALS
HEIGHT: 72 IN | WEIGHT: 180 LBS | DIASTOLIC BLOOD PRESSURE: 75 MMHG | BODY MASS INDEX: 24.38 KG/M2 | TEMPERATURE: 97.9 F | SYSTOLIC BLOOD PRESSURE: 136 MMHG | OXYGEN SATURATION: 98 % | HEART RATE: 97 BPM

## 2024-01-24 DIAGNOSIS — R10.13 EPIGASTRIC PAIN: ICD-10-CM

## 2024-01-24 DIAGNOSIS — Z86.010 PERSONAL HISTORY OF COLONIC POLYPS: ICD-10-CM

## 2024-01-24 DIAGNOSIS — K44.9 DIAPHRAGMATIC HERNIA W/OUT OBSTRUCTION OR GANGRENE: ICD-10-CM

## 2024-01-24 DIAGNOSIS — R19.7 DIARRHEA, UNSPECIFIED: ICD-10-CM

## 2024-01-24 DIAGNOSIS — K22.70 BARRETT'S ESOPHAGUS W/OUT DYSPLASIA: ICD-10-CM

## 2024-01-24 PROCEDURE — 99214 OFFICE O/P EST MOD 30 MIN: CPT

## 2024-01-24 RX ORDER — CHOLESTYRAMINE 4 G/9G
4 POWDER, FOR SUSPENSION ORAL DAILY
Qty: 30 | Refills: 3 | Status: ACTIVE | COMMUNITY
Start: 2024-01-24 | End: 1900-01-01

## 2024-01-24 RX ORDER — PANTOPRAZOLE 40 MG/1
40 TABLET, DELAYED RELEASE ORAL
Qty: 30 | Refills: 3 | Status: ACTIVE | COMMUNITY
Start: 2024-01-24 | End: 1900-01-01

## 2024-01-24 RX ORDER — SIMETHICONE 125 MG/1
125 TABLET, CHEWABLE ORAL
Qty: 120 | Refills: 3 | Status: ACTIVE | COMMUNITY
Start: 2024-01-24 | End: 1900-01-01

## 2024-01-24 NOTE — HISTORY OF PRESENT ILLNESS
[FreeTextEntry1] : The colonoscopy to the terminal ileum performed at the [facility] [place] GI endoscopy suite on September 24, 2019 revealed moderate pandiverticulosis that was primarily concentrated to the left colon and small internal hemorrhoids. There were no polyps, masses, AVMs or colitis noted.\par   [de-identified] : The CAT scan of the abdomen and pelvis with IV contrast performed on February 15, 2022 revealed diverticulosis distal descending colon and sigmoid colon without evidence of acute diverticulitis. There are calcifications in the head of the pancreas occasionally in the uncinate process without change which may be due to chronic pancreatitis. \par  \par  The CAT scan of the abdomen and pelvis with IV contrast performed on June 26, 2013 revealed extensive colonic diverticulosis, minimal haziness in the peritoneal fat adjacent to the distal and mid descending colon that might indicate minimal diverticulitis. Also noted were a few small calcifications in the inferior pancreatic head consistent with old/chronic pancreatitis.

## 2024-01-24 NOTE — ASSESSMENT
[FreeTextEntry1] : Dyspepsia: The patient complains of dyspeptic symptoms.  The patient was advised to continue to abide by an anti-gas (low FOD-MAP) diet.  The patient was previously given a pamphlet for anti-gas (low FOD-MAP).  The patient and I reviewed the anti-gas (low FOD-MAP)diet at length again. The patient is to continue on a trial of Simethicone one tablet 4 times a day p.r.n. abdominal pain and gas. Diarrhea: The patient complains of diarrhea. I recommend a low residue diet. The patient is to avoid fiber supplementation. The patient is to consider restarting a trial of a probiotic such as Align once a day. The stool studies performed on August 28, 2023 revealed a detected enteropathogenic E. coli on GI PCR stool study.  The stool studies performed on August 29 2023 revealed detected enteropathogenic E. coli, a nondetected GDH antibody and nondetected toxin a and B.The symptoms are worse after meals. The patient has a history of cholecystectomy. I recommend a trial of cholestyramine one packet once a day for possible bile induced diarrhea. If the symptoms persist, the patient may require a colonoscopy to assess for colitis versus other causes. The patient was told of the risks and benefits of the procedure. The patient was told of the risks of perforation, emergency surgery, bleeding, infections and missed lesions. The patient agreed and will follow-up to reassess the symptoms. History of Diverticulitis: The patient has symptoms suggestive of acute diverticulitis. The patient may require an emergency room evaluation for diverticulitis if the symptoms persist. The patient may require treatment with antibiotics if the symptoms persist. The patient may require treatment with a trial of Metronidazole 500 mg 3 times a day and Ciprofloxin 500 mg twice a day for 2 weeks for the presumed acute diverticulitis. I recommend a low residue diet. If the symptoms persist, the patient may require a CAT scan of the abdomen and pelvis with IV contrast to assess the acute diverticulitis and possible complications. The patient was advised to go to the hospital if fever, chills, worsening abdominal pain or GI bleeding recurs. The patient agrees. Prior Urgent Care Evaluation: The patient states that he had a bout of diverticulitis. The patient was evaluated at an urgent care center and was treated with metronidazole and ciprofloxacin for possible acute diverticulitis. The patient had just had a CAT scan of the abdomen pelvis with IV contrast to assess the symptoms. The CAT scan of the abdomen and pelvis with IV contrast performed on February 15, 2022 revealed diverticulosis distal descending colon and sigmoid colon without evidence of acute diverticulitis. There are calcifications in the head of the pancreas occasionally in the uncinate process without change which may be due to chronic pancreatitis. History of Weight Loss: The patient complains of weight loss and anorexia. The patient admits to losing 9 pounds over the past 4 to 6 months. The patient attributes the weight loss to change in diet, anxiety and stress. The patient is being followed by his psychiatrist. The patient's medications were changed with improvement of the symptoms. The patient is currently on Lorazepam, Resperidone and increased Sertraline. The patient currently gained weight. Hiatal Hernia: The patient was advised to avoid late-night meals and dietary indiscretions. The patient was advised to avoid fried and fatty foods. The patient was advised to abide by an anti-GERD diet. The patient was given a pamphlet for anti-GERD. The patient and I reviewed the anti-GERD diet at length. I recommend a trial of Pantoprazole 40 mg once a day for the Torres's esophagus. Gastritis: The patient has a history of gastritis. The patient is to avoid nonsteroidal anti-inflammatory drugs and aspirin. I recommend a trial of pantoprazole 40 mg once a day for the symptoms and Torres's esophagus. Torres's Esophagus: The patient has a history of Torres's esophagus on recent upper endoscopy. The patient and I had a long discussion regarding the risk of Torres's esophagus progressing to esophageal cancer. The patient was told of the importance of acid suppression. The patient was told of the importance of follow-up for repeat endoscopies to assess for dysplasia. The patient agrees and will follow up. I recommend a trial of Pantoprazole 40 mg once a day for the Torres's esophagus. I recommend a repeat upper endoscopy to reassess for Torres's esophagus and dysplasia unless symptomatic. The patient agreed and will follow up for the procedure. Upper Endoscopy: I recommend an upper endoscopy to assess for peptic ulcer disease versus esophagitis.  The patient was told of the risks and benefits of the procedure.  The patient was told of the risks of perforation, emergency surgery, bleeding, infections and missed lesions. The patient is told not to drive, drink alcohol, use recreational drugs, exercise, or work the day of the procedure.  The patient was told of the need for an escort to accompany the patient home after the procedure. The patient is aware that the procedure may be cancelled if they fail to follow the directions.  The patient agreed and will schedule for the procedure. The patient is to be n.p.o. after midnight.  The patient is to return for the procedure. Gastric AVMs: The patient had a prior upper endoscopy that revealed nonbleeding gastric AVMs. The gastric AVMs were not cauterized at the time. The patient denies any bright red blood per rectum, melena or hematemesis. If bleeding occurs, the patient may require ablation of a gastric AVM Diverticulosis: I recommend a low residue diet and avoid seeds. The patient is to avoid Metamucil or fiber supplementation. The patient is to also consider a trial of a probiotic such as Align once a day. Internal Hemorrhoids: The patient is to consider a trial of Anusol H. C. suppositories one per rectum nightly and Anusol HC2.5% cream apply to affected area twice a day p.r.n. hemorrhoidal bleeding or pain. History of Colonic Polyps: The patient has a history of colonic polyps.  I recommend a repeat colonoscopy to reassess for colonic polyps.  The patient was told of the risks and benefits of the procedure.  The patient was told of the risks of perforation, emergency surgery, bleeding, infections and missed lesions.  The patient is to be on a clear liquid diet the entire day prior to the procedure. The patient is to complete the entire prescribed bowel prep the day prior to the procedure as directed. The patient is told not to drive, drink alcohol, use recreational drugs, exercise, or work the day of the procedure.  The patient was told of the need for an escort to accompany the patient home after the procedure. The patient is aware that the procedure may be cancelled if they fail to follow the directions.  The patient agreed and will schedule for the procedure. The patient is to be n.p.o. after midnight and bowel prep was given.  The patient is to return for the procedure.  Family History of Colonic Polyps: The patient has a family history of colonic polyps.  I recommend a colonoscopy to assess for colonic polyps.  The patient was told of the risks and benefits of the procedure.  The patient was told of the risks of perforation, emergency surgery, bleeding, infections and missed lesions.  The patient is to be on a clear liquid diet the entire day prior to the procedure. The patient is to complete the entire prescribed bowel prep the day prior to the procedure as directed. The patient is told not to drive, drink alcohol, use recreational drugs, exercise, or work the day of the procedure.  The patient was told of the need for an escort to accompany the patient home after the procedure. The patient is aware that the procedure may be cancelled if they fail to follow the directions.  The patient agreed and will schedule for the procedure. The patient is to be n.p.o. after midnight and bowel prep was given.  The patient is to return for the procedure.  Family History of Colon Cancer: The patient has a family history of colon cancer.  I recommend a colonoscopy to reassess for colonic polyps.  The patient was told of the risks and benefits of the procedure.  The patient was told of the risks of perforation, emergency surgery, bleeding, infections and missed lesions.  The patient is to be on a clear liquid diet the entire day prior to the procedure. The patient is to complete the entire prescribed bowel prep the day prior to the procedure as directed. The patient is told not to drive, drink alcohol, use recreational drugs, exercise, or work the day of the procedure.  The patient was told of the need for an escort to accompany the patient home after the procedure. The patient is aware that the procedure may be cancelled if they fail to follow the directions.  The patient agreed and will schedule for the procedure. The patient is to be n.p.o. after midnight and bowel prep was given.  The patient is to return for the procedure.  Lung Nodule: The patient was again advised to followup with his pulmonologist, Dr. Hayes for the lung nodule. He is aware of the risk of lung cancer and prior exposure to asbestos and knows the importance of followup. The patient is going for his annual CT of the chest without IV contrast in June 2024 to reassess the stability of the lung nodule. Prior ER Evaluation: The patient was previously evaluated at the [facility] emergency room for chest pain. The patient had blood work and imaging studies to assess the symptoms. I reviewed the blood work and imaging studies performed in the emergency room. Follow-up: The patient is to follow-up in the office in 6 months to reassess the symptoms. The patient was told to call the office if any further problems.

## 2024-02-26 ENCOUNTER — RX RENEWAL (OUTPATIENT)
Age: 64
End: 2024-02-26

## 2024-02-26 RX ORDER — PANTOPRAZOLE 40 MG/1
40 TABLET, DELAYED RELEASE ORAL
Qty: 90 | Refills: 3 | Status: ACTIVE | COMMUNITY
Start: 2020-09-11 | End: 1900-01-01

## 2024-07-22 ENCOUNTER — APPOINTMENT (OUTPATIENT)
Dept: GASTROENTEROLOGY | Facility: CLINIC | Age: 64
End: 2024-07-22
Payer: COMMERCIAL

## 2024-07-22 VITALS
WEIGHT: 179 LBS | BODY MASS INDEX: 24.28 KG/M2 | DIASTOLIC BLOOD PRESSURE: 68 MMHG | HEART RATE: 80 BPM | SYSTOLIC BLOOD PRESSURE: 109 MMHG | TEMPERATURE: 97.2 F

## 2024-07-22 DIAGNOSIS — R63.4 ABNORMAL WEIGHT LOSS: ICD-10-CM

## 2024-07-22 DIAGNOSIS — Z86.010 PERSONAL HISTORY OF COLONIC POLYPS: ICD-10-CM

## 2024-07-22 DIAGNOSIS — R19.7 DIARRHEA, UNSPECIFIED: ICD-10-CM

## 2024-07-22 DIAGNOSIS — Z80.9 FAMILY HISTORY OF MALIGNANT NEOPLASM, UNSPECIFIED: ICD-10-CM

## 2024-07-22 DIAGNOSIS — K44.9 DIAPHRAGMATIC HERNIA W/OUT OBSTRUCTION OR GANGRENE: ICD-10-CM

## 2024-07-22 DIAGNOSIS — Z87.19 PERSONAL HISTORY OF OTHER DISEASES OF THE DIGESTIVE SYSTEM: ICD-10-CM

## 2024-07-22 DIAGNOSIS — K22.70 BARRETT'S ESOPHAGUS W/OUT DYSPLASIA: ICD-10-CM

## 2024-07-22 PROCEDURE — 99214 OFFICE O/P EST MOD 30 MIN: CPT

## 2024-07-22 RX ORDER — SODIUM PICOSULFATE, MAGNESIUM OXIDE, AND ANHYDROUS CITRIC ACID 12; 3.5; 1 G/175ML; G/175ML; MG/175ML
10-3.5-12 MG-GM LIQUID ORAL TWICE DAILY
Qty: 2 | Refills: 0 | Status: ACTIVE | COMMUNITY
Start: 2024-07-22 | End: 1900-01-01

## 2024-07-22 NOTE — HISTORY OF PRESENT ILLNESS
[FreeTextEntry1] : The colonoscopy to the terminal ileum performed at the [facility] GI endoscopy suite on September 24, 2019 revealed moderate pandiverticulosis that was primarily concentrated to the left colon and small internal hemorrhoids. There were no polyps, masses, AVMs or colitis noted.  [de-identified] : The CAT scan of the abdomen and pelvis with IV contrast performed on February 15, 2022 revealed diverticulosis distal descending colon and sigmoid colon without evidence of acute diverticulitis. There are calcifications in the head of the pancreas occasionally in the uncinate process without change which may be due to chronic pancreatitis. \par  \par  The CAT scan of the abdomen and pelvis with IV contrast performed on June 26, 2013 revealed extensive colonic diverticulosis, minimal haziness in the peritoneal fat adjacent to the distal and mid descending colon that might indicate minimal diverticulitis. Also noted were a few small calcifications in the inferior pancreatic head consistent with old/chronic pancreatitis.

## 2024-09-11 ENCOUNTER — EMERGENCY (EMERGENCY)
Facility: HOSPITAL | Age: 64
LOS: 1 days | Discharge: HOME | End: 2024-09-11
Attending: EMERGENCY MEDICINE | Admitting: EMERGENCY MEDICINE
Payer: COMMERCIAL

## 2024-09-11 VITALS
RESPIRATION RATE: 19 BRPM | SYSTOLIC BLOOD PRESSURE: 121 MMHG | TEMPERATURE: 98 F | DIASTOLIC BLOOD PRESSURE: 60 MMHG | OXYGEN SATURATION: 100 % | HEART RATE: 88 BPM

## 2024-09-11 VITALS
RESPIRATION RATE: 16 BRPM | SYSTOLIC BLOOD PRESSURE: 111 MMHG | WEIGHT: 179.9 LBS | HEART RATE: 83 BPM | OXYGEN SATURATION: 96 % | TEMPERATURE: 97 F | DIASTOLIC BLOOD PRESSURE: 74 MMHG | HEIGHT: 72 IN

## 2024-09-11 LAB
ALBUMIN SERPL ELPH-MCNC: 4.1 G/DL — SIGNIFICANT CHANGE UP (ref 3.3–5)
ALP SERPL-CCNC: 56 U/L — SIGNIFICANT CHANGE UP (ref 40–120)
ALT FLD-CCNC: 22 U/L — SIGNIFICANT CHANGE UP (ref 4–41)
ANION GAP SERPL CALC-SCNC: 13 MMOL/L — SIGNIFICANT CHANGE UP (ref 7–14)
AST SERPL-CCNC: 20 U/L — SIGNIFICANT CHANGE UP (ref 4–40)
BASOPHILS # BLD AUTO: 0.06 K/UL — SIGNIFICANT CHANGE UP (ref 0–0.2)
BASOPHILS NFR BLD AUTO: 0.9 % — SIGNIFICANT CHANGE UP (ref 0–2)
BILIRUB SERPL-MCNC: 0.5 MG/DL — SIGNIFICANT CHANGE UP (ref 0.2–1.2)
BUN SERPL-MCNC: 10 MG/DL — SIGNIFICANT CHANGE UP (ref 7–23)
CALCIUM SERPL-MCNC: 9.4 MG/DL — SIGNIFICANT CHANGE UP (ref 8.4–10.5)
CHLORIDE SERPL-SCNC: 105 MMOL/L — SIGNIFICANT CHANGE UP (ref 98–107)
CO2 SERPL-SCNC: 23 MMOL/L — SIGNIFICANT CHANGE UP (ref 22–31)
CREAT SERPL-MCNC: 0.7 MG/DL — SIGNIFICANT CHANGE UP (ref 0.5–1.3)
EGFR: 105 ML/MIN/1.73M2 — SIGNIFICANT CHANGE UP
EGFR: 105 ML/MIN/1.73M2 — SIGNIFICANT CHANGE UP
EOSINOPHIL # BLD AUTO: 0.27 K/UL — SIGNIFICANT CHANGE UP (ref 0–0.5)
EOSINOPHIL NFR BLD AUTO: 3.9 % — SIGNIFICANT CHANGE UP (ref 0–6)
GLUCOSE SERPL-MCNC: 90 MG/DL — SIGNIFICANT CHANGE UP (ref 70–99)
HCT VFR BLD CALC: 39.2 % — SIGNIFICANT CHANGE UP (ref 39–50)
HGB BLD-MCNC: 13.3 G/DL — SIGNIFICANT CHANGE UP (ref 13–17)
IANC: 3.47 K/UL — SIGNIFICANT CHANGE UP (ref 1.8–7.4)
IMM GRANULOCYTES NFR BLD AUTO: 0.7 % — SIGNIFICANT CHANGE UP (ref 0–0.9)
LYMPHOCYTES # BLD AUTO: 2.62 K/UL — SIGNIFICANT CHANGE UP (ref 1–3.3)
LYMPHOCYTES # BLD AUTO: 38.2 % — SIGNIFICANT CHANGE UP (ref 13–44)
MCHC RBC-ENTMCNC: 29.4 PG — SIGNIFICANT CHANGE UP (ref 27–34)
MCHC RBC-ENTMCNC: 33.9 GM/DL — SIGNIFICANT CHANGE UP (ref 32–36)
MCV RBC AUTO: 86.5 FL — SIGNIFICANT CHANGE UP (ref 80–100)
MONOCYTES # BLD AUTO: 0.39 K/UL — SIGNIFICANT CHANGE UP (ref 0–0.9)
MONOCYTES NFR BLD AUTO: 5.7 % — SIGNIFICANT CHANGE UP (ref 2–14)
NEUTROPHILS # BLD AUTO: 3.47 K/UL — SIGNIFICANT CHANGE UP (ref 1.8–7.4)
NEUTROPHILS NFR BLD AUTO: 50.6 % — SIGNIFICANT CHANGE UP (ref 43–77)
NRBC # BLD AUTO: 0 K/UL — SIGNIFICANT CHANGE UP (ref 0–0)
NRBC # BLD: 0 /100 WBCS — SIGNIFICANT CHANGE UP (ref 0–0)
NRBC # FLD: 0 K/UL — SIGNIFICANT CHANGE UP (ref 0–0)
NRBC BLD-RTO: 0 /100 WBCS — SIGNIFICANT CHANGE UP (ref 0–0)
PLATELET # BLD AUTO: 205 K/UL — SIGNIFICANT CHANGE UP (ref 150–400)
POTASSIUM SERPL-MCNC: 4.5 MMOL/L — SIGNIFICANT CHANGE UP (ref 3.5–5.3)
POTASSIUM SERPL-SCNC: 4.5 MMOL/L — SIGNIFICANT CHANGE UP (ref 3.5–5.3)
PROT SERPL-MCNC: 7.1 G/DL — SIGNIFICANT CHANGE UP (ref 6–8.3)
RBC # BLD: 4.53 M/UL — SIGNIFICANT CHANGE UP (ref 4.2–5.8)
RBC # FLD: 13.5 % — SIGNIFICANT CHANGE UP (ref 10.3–14.5)
SODIUM SERPL-SCNC: 141 MMOL/L — SIGNIFICANT CHANGE UP (ref 135–145)
TSH SERPL-MCNC: 2.38 UIU/ML — SIGNIFICANT CHANGE UP (ref 0.27–4.2)
WBC # BLD: 6.86 K/UL — SIGNIFICANT CHANGE UP (ref 3.8–10.5)
WBC # FLD AUTO: 6.86 K/UL — SIGNIFICANT CHANGE UP (ref 3.8–10.5)

## 2024-09-11 PROCEDURE — 93010 ELECTROCARDIOGRAM REPORT: CPT

## 2024-09-11 PROCEDURE — 99284 EMERGENCY DEPT VISIT MOD MDM: CPT

## 2024-09-11 PROCEDURE — 70450 CT HEAD/BRAIN W/O DYE: CPT | Mod: 26,MC

## 2024-09-11 RX ORDER — ACETAMINOPHEN 500 MG/5ML
1000 LIQUID (ML) ORAL ONCE
Refills: 0 | Status: COMPLETED | OUTPATIENT
Start: 2024-09-11 | End: 2024-09-11

## 2024-09-11 RX ADMIN — Medication 400 MILLIGRAM(S): at 21:12

## 2024-09-12 LAB — VIT B12 SERPL-MCNC: 336 PG/ML — SIGNIFICANT CHANGE UP (ref 200–900)

## 2024-10-24 ENCOUNTER — APPOINTMENT (OUTPATIENT)
Dept: GASTROENTEROLOGY | Facility: HOSPITAL | Age: 64
End: 2024-10-24

## 2024-10-24 ENCOUNTER — TRANSCRIPTION ENCOUNTER (OUTPATIENT)
Age: 64
End: 2024-10-24

## 2024-10-24 ENCOUNTER — RESULT REVIEW (OUTPATIENT)
Age: 64
End: 2024-10-24

## 2024-11-22 ENCOUNTER — APPOINTMENT (OUTPATIENT)
Dept: GASTROENTEROLOGY | Facility: CLINIC | Age: 64
End: 2024-11-22

## 2024-12-18 RX ORDER — ONDANSETRON 4 MG/1
4 TABLET, ORALLY DISINTEGRATING ORAL TWICE DAILY
Qty: 60 | Refills: 3 | Status: ACTIVE | COMMUNITY
Start: 2024-12-18 | End: 1900-01-01

## 2025-01-12 NOTE — ASSESSMENT
1/13/2025      Depressed, sleeping has bee very nauseated,  buggin her to eat  Stopped all meds  15 bid and restaert effexor  Message her next week about b lood sugar and mood      HISTORY OF PRESENT ILLNESS:  Osiris Henson presents for follow-up after emergency room visit on January 2 for 10-day history of left lower quadrant abdominal pain and nausea.  CT found no cause for her abdominal pain.  She was noted to have low magnesium at 1.4 her bilirubin was also elevated at 1.5 remainder of her labs were unremarkable.  She notes that she still having discomfort across her diaphragm.  She is nauseated but it has improved.  She has stopped taking all of her medication currently because she is wondering if it is contributing to her nausea partly because her depression is quite severe and she is not eating and she does not really want to do anything other than lie in bed.  Her  is also constantly telling her she needs to eat and that she is scaring him and that she is not eating.  She notes that this is very hard on her to make her depression worse and makes her want to avoid him.  She also recognizes this as his pattern.    Current Outpatient Medications   Medication Sig Dispense Refill    insulin NPH (NovoLIN N) 100 UNIT/ML injectable suspension Inject 15 Units into the skin every 12 hours.      venlafaxine XR (EFFEXOR XR) 150 MG 24 hr capsule Take 1 capsule by mouth daily.      pregabalin (LYRICA) 100 MG capsule Take 1 capsule by mouth daily. (Patient not taking: Reported on 1/13/2025) 90 capsule 1    atorvastatin (LIPITOR) 40 MG tablet Take 1 tablet by mouth nightly. (Patient not taking: Reported on 1/13/2025) 90 tablet 1    carvedilol (COREG) 12.5 MG tablet Take 1 tablet by mouth every 12 hours. (Patient not taking: Reported on 1/13/2025) 180 tablet 1    glimepiride (AMARYL) 2 MG tablet Take 1 tablet by mouth in the morning and 1 tablet in the evening. Take before meals. (Patient not taking: Reported  [FreeTextEntry1] : Dyspepsia: The patient complains of dyspeptic symptoms.  The patient was advised to continue to abide by an anti-gas (low FOD-MAP) diet.  The patient was previously given a pamphlet for anti-gas (low FOD-MAP).  The patient and I reviewed the anti-gas (low FOD-MAP) diet at length again. The patient is to continue on a trial of Simethicone one tablet 4 times a day p.r.n. abdominal pain and gas. Diarrhea: The patient complains of diarrhea. I recommend a low residue diet. The patient is to avoid fiber supplementation. The patient is to consider restarting a trial of a probiotic such as Align once a day. The stool studies performed on August 28, 2023 revealed a detected enteropathogenic E. coli on GI PCR stool study. The stool studies performed on August 29 2023 revealed detected enteropathogenic E. coli, a nondetected GDH antibody and nondetected toxin a and B.  The symptoms are worse after meals. The patient has a history of cholecystectomy. I recommend a trial of cholestyramine one packet once a day for possible bile induced diarrhea. If the symptoms persist, the patient may require a colonoscopy to assess for colitis versus other causes. The patient was told of the risks and benefits of the procedure. The patient was told of the risks of perforation, emergency surgery, bleeding, infections and missed lesions. The patient agreed and will follow-up to reassess the symptoms. History of Diverticulitis: The patient has symptoms suggestive of acute diverticulitis. The patient may require an emergency room evaluation for diverticulitis if the symptoms persist. The patient may require treatment with antibiotics if the symptoms persist. The patient may require treatment with a trial of Metronidazole 500 mg 3 times a day and Ciprofloxin 500 mg twice a day for 2 weeks for the presumed acute diverticulitis. I recommend a low residue diet. If the symptoms persist, the patient may require a CAT scan of the abdomen and pelvis with IV contrast to assess the acute diverticulitis and possible complications. The patient was advised to go to the hospital if fever, chills, worsening abdominal pain or GI bleeding recurs. The patient agrees. Prior Urgent Care Evaluation: The patient states that he had a bout of diverticulitis. The patient was evaluated at an urgent care center and was treated with metronidazole and ciprofloxacin for possible acute diverticulitis. The patient had just had a CAT scan of the abdomen pelvis with IV contrast to assess the symptoms. The CAT scan of the abdomen and pelvis with IV contrast performed on February 15, 2022 revealed diverticulosis distal descending colon and sigmoid colon without evidence of acute diverticulitis. There are calcifications in the head of the pancreas occasionally in the uncinate process without change which may be due to chronic pancreatitis. History of Weight Loss: The patient complains of weight loss and anorexia. The patient admits to losing 9 pounds over the past 4 to 6 months. The patient attributes the weight loss to change in diet, anxiety and stress. The patient is being followed by his psychiatrist. The patient's medications were changed with improvement of the symptoms. The patient is currently on Lorazepam, Resperidone and increased Sertraline. The patient currently gained weight. Hiatal Hernia: The patient was advised to avoid late-night meals and dietary indiscretions. The patient was advised to avoid fried and fatty foods. The patient was advised to abide by an anti-GERD diet. The patient was given a pamphlet for anti-GERD. The patient and I reviewed the anti-GERD diet at length. I recommend a trial of Pantoprazole 40 mg once a day for the Torres's esophagus. Gastritis: The patient has a history of gastritis. The patient is to avoid nonsteroidal anti-inflammatory drugs and aspirin. I recommend a trial of pantoprazole 40 mg once a day for the symptoms and Torres's esophagus. Torres's Esophagus: The patient has a history of Torres's esophagus on recent upper endoscopy. The patient and I had a long discussion regarding the risk of Torres's esophagus progressing to esophageal cancer. The patient was told of the importance of acid suppression. The patient was told of the importance of follow-up for repeat endoscopies to assess for dysplasia. The patient agrees and will follow up. I recommend a trial of Pantoprazole 40 mg once a day for the Torres's esophagus. I recommend a repeat upper endoscopy to reassess for Torres's esophagus and dysplasia unless symptomatic. The patient agreed and will follow up for the procedure. Upper Endoscopy: I recommend an upper endoscopy to assess for peptic ulcer disease versus esophagitis. The patient was told of the risks and benefits of the procedure. The patient was told of the risks of perforation, emergency surgery, bleeding, infections and missed lesions. The patient is told not to drive, drink alcohol, use recreational drugs, exercise, or work the day of the procedure. The patient was told of the need for an escort to accompany the patient home after the procedure. The patient is aware that the procedure may be cancelled if they fail to follow the directions. The patient agreed and will schedule for the procedure. The patient is to be n.p.o. after midnight. The patient is to return for the procedure. Gastric AVMs: The patient had a prior upper endoscopy that revealed nonbleeding gastric AVMs. The gastric AVMs were not cauterized at the time. The patient denies any bright red blood per rectum, melena or hematemesis. If bleeding occurs, the patient may require ablation of a gastric AVM Diverticulosis: I recommend a low residue diet and avoid seeds. The patient is to avoid Metamucil or fiber supplementation. The patient is to also consider a trial of a probiotic such as Align once a day. Internal Hemorrhoids: The patient is to consider a trial of Anusol H. C. suppositories one per rectum nightly and Anusol HC2.5% cream apply to affected area twice a day p.r.n. hemorrhoidal bleeding or pain. History of Colonic Polyps: The patient has a history of colonic polyps. I recommend a repeat colonoscopy to reassess for colonic polyps. The patient was told of the risks and benefits of the procedure. The patient was told of the risks of perforation, emergency surgery, bleeding, infections and missed lesions. The patient is to be on a clear liquid diet the entire day prior to the procedure. The patient is to complete the entire prescribed bowel prep the day prior to the procedure as directed. The patient is told not to drive, drink alcohol, use recreational drugs, exercise, or work the day of the procedure. The patient was told of the need for an escort to accompany the patient home after the procedure. The patient is aware that the procedure may be cancelled if they fail to follow the directions. The patient agreed and will schedule for the procedure. The patient is to be n.p.o. after midnight and bowel prep was given. The patient is to return for the procedure. Family History of Colonic Polyps: The patient has a family history of colonic polyps. I recommend a colonoscopy to assess for colonic polyps. The patient was told of the risks and benefits of the procedure. The patient was told of the risks of perforation, emergency surgery, bleeding, infections and missed lesions. The patient is to be on a clear liquid diet the entire day prior to the procedure. The patient is to complete the entire prescribed bowel prep the day prior to the procedure as directed. The patient is told not to drive, drink alcohol, use recreational drugs, exercise, or work the day of the procedure. The patient was told of the need for an escort to accompany the patient home after the procedure. The patient is aware that the procedure may be cancelled if they fail to follow the directions. The patient agreed and will schedule for the procedure. The patient is to be n.p.o. after midnight and bowel prep was given. The patient is to return for the procedure. Family History of GI Malignancy:  The patient admits to being abstinent of alcohol for 9 years. The patient admits to a family history of colon cancer and diverticulitis. Family History of Colon Cancer: The patient has a family history of colon cancer. I recommend a colonoscopy to reassess for colonic polyps. The patient was told of the risks and benefits of the procedure. The patient was told of the risks of perforation, emergency surgery, bleeding, infections and missed lesions. The patient is to be on a clear liquid diet the entire day prior to the procedure. The patient is to complete the entire prescribed bowel prep the day prior to the procedure as directed. The patient is told not to drive, drink alcohol, use recreational drugs, exercise, or work the day of the procedure. The patient was told of the need for an escort to accompany the patient home after the procedure. The patient is aware that the procedure may be cancelled if they fail to follow the directions. The patient agreed and will schedule for the procedure. The patient is to be n.p.o. after midnight and bowel prep was given. The patient is to return for the procedure. History of Lung Nodule: The patient was again advised to followup with his pulmonologist, Dr. Hayes for the lung nodule. He is aware of the risk of lung cancer and prior exposure to asbestos and knows the importance of followup. The patient is going for his annual CT of the chest without IV contrast in June 2024 to reassess the stability of the lung nodule. Prior ER Evaluation: The patient was previously evaluated at the [facility] emergency room for chest pain. The patient had blood work and imaging studies to assess the symptoms. I reviewed the blood work and imaging studies performed in the emergency room. Follow-up: The patient is to follow-up in the office in 1 month to reassess the symptoms. The patient was told to call the office if any further problems.      on 1/13/2025) 180 tablet 1    losartan-hydrochlorothiazide (HYZAAR) 100-25 MG per tablet Take 1 tablet by mouth daily. (Patient not taking: Reported on 1/13/2025) 90 tablet 3    omeprazole (PrilOSEC) 20 MG capsule Take 2 capsules by mouth daily. (Patient not taking: Reported on 1/13/2025) 180 capsule 1    ipratropium-albuterol (DUONEB) 0.5-2.5 (3) MG/3ML nebulizer solution Take 3 mLs by nebulization every 4 hours as needed for Wheezing or Shortness of Breath. (Patient not taking: Reported on 1/13/2025) 360 mL 0    albuterol 108 (90 Base) MCG/ACT inhaler Inhale 2 puffs into the lungs every 4 hours as needed for Shortness of Breath or Wheezing. (Patient not taking: Reported on 1/13/2025) 1 each 0    venlafaxine XR (EFFEXOR XR) 75 MG 24 hr capsule TAKE 1 CAPSULE BY MOUTH DAILY  WITH 150 MG FOR A TOTAL DAILY  DOSE OF 225MG (Patient not taking: Reported on 1/13/2025) 90 capsule 3    estradiol (ESTRACE) 0.1 MG/GM vaginal cream Place 1 g vaginally 2 days a week. (Patient not taking: Reported on 6/7/2024) 42.5 g 12    acetaminophen (TYLENOL) 500 MG tablet Take 1,000 mg by mouth daily as needed for Pain. (Patient not taking: Reported on 1/13/2025)      BD Veo Insulin Syringe U/F 31G X 15/64\" 0.5 ML Misc USE TWICE DAILY FOR INSULIN INJECTION (Patient not taking: Reported on 1/13/2025) 200 each 1    FREESTYLE LITE test strip Test blood sugar 4 times daily. Diagnosis: E11.9; Z79.4. Meter: FreeStyle Lite (Patient not taking: Reported on 1/13/2025) 150 strip 11    aspirin (SB Low Dose ASA EC) 81 MG EC tablet Take 1 tablet by mouth daily. (Patient not taking: Reported on 1/13/2025) 30 tablet 1    BD SafetyGlide Insulin Syringe 31G X 15/64\" 0.5 ML Misc USE TWICE DAILY FOR INSULIN INJECTION (Patient not taking: Reported on 1/13/2025) 200 each 1    ondansetron (Zofran ODT) 4 MG disintegrating tablet Place 1 tablet onto the tongue every 8 hours as needed for Nausea. (Patient not taking: Reported on 1/13/2025) 10 tablet 0     Spacer/Aero-Holding Chambers (AEROCHAMBER) Use with the MDI (Patient not taking: Reported on 1/13/2025) 1 each 0    Probiotic Product (PROBIOTIC & ACIDOPHILUS EX ST) Cap Take 1 capsule by mouth daily.  (Patient not taking: Reported on 1/13/2025)      Cholecalciferol (VITAMIN D3) 2000 UNIT OR CAPS Take 50 mcg by mouth daily.  (Patient not taking: Reported on 1/13/2025) 30      No current facility-administered medications for this visit.       Patient Active Problem List   Diagnosis    Moderate episode of recurrent major depressive disorder (CMD)    Tear film insufficiency, unspecified    Other vitreous opacities    Presbyopia    Essential hypertension    Mixed hyperlipidemia    Allergic rhinitis, cause unspecified    Type 2 diabetes mellitus without complication, with long-term current use of insulin  (CMD)    Mild intermittent asthma without complication (CMD)    Borderline glaucoma with ocular hypertension    Vitreous degeneration: posterior vitreous detachment    Vitreous degeneration of right eye    Lumbar spondylosis    Spondylolisthesis of lumbar region    Vitreomacular traction    Osteoarthritis of both knees    Epiretinal membrane    Dense breasts    History of small bowel obstruction    Cerebral aneurysm (CMD)    Spinal stenosis of lumbar region with neurogenic claudication    Neural foraminal stenosis of lumbar spine    Morbid obesity with BMI of 40.0-44.9, adult  (CMD)    Type 2 diabetes mellitus with morbid obesity  (CMD)    Left oculomotor nerve palsy    Atherosclerosis of aorta (CMD)       Past Medical History:   Diagnosis Date    Acute atopic conjunctivitis 07/11/2003    Allergic rhinitis, cause unspecified     ASTHMA UNSPECIFIED 03/25/2008    Borderline glaucoma with ocular hypertension 08/19/2010    Depressive disorder, not elsewhere classified     Diverticulosis 04/21/2017    sigmoid colon    Herpetic irving 2005    Mixed hyperlipidemia 12/2007    Osteoarthritis of both knees     Presbyopia  2003    Spondylolisthesis of lumbar region     TEAR FILM INSUFFIC NOS 2003    Type II or unspecified type diabetes mellitus without mention of complication, not stated as uncontrolled     Unspecified essential hypertension     Unspecified intestinal obstruction 2007    small bowel obstruction secondary to adhesions    VITREOUS OPACITIES NEC 2003       SOCIAL HISTORY:  Social History     Tobacco Use    Smoking status: Former     Current packs/day: 0.00     Average packs/day: 0.5 packs/day for 35.0 years (17.5 ttl pk-yrs)     Types: Cigarettes     Start date: 1977     Quit date: 2012     Years since quittin.0    Smokeless tobacco: Never    Tobacco comments:     no additional smokers at home   Substance Use Topics    Alcohol use: Not Currently     Alcohol/week: 0.0 standard drinks of alcohol     Comment: Socially, less than once per month       Recent PHQ 2/9 Score    PHQ 2:  PHQ 2 Score Adult PHQ 2 Score Adult PHQ 2 Interpretation Little interest or pleasure in activity?   2025  11:40 AM 2 No further screening needed 1       PHQ 9:  PHQ 9 Score Adult PHQ 9 Score Adult PHQ 9 Interpretation   3/19/2024   2:22 PM 10 Moderate Depression        PHYSICAL EXAM:    VITAL SIGNS:  Visit Vitals  BP (!) 150/64 (BP Location: LUE - Left upper extremity, Patient Position: Sitting, Cuff Size: Large Adult)   Wt 111 kg (244 lb 12.8 oz)   BMI 39.51 kg/m²     GENERAL: Alert, pleasant, flat affect, tired appearing, slightly disheveled appearing  NEUROLOGIC:  Grossly normal - affect appropriate, speech clear, cognitive function intact, gross motor movement normal.   PSYCH: Affect flat, slowed, no hallucinations or delusions    ASSESSMENT/PLAN:   1. Hypomagnesemia  Patient is not on a potassium supplement  - Magnesium; Future    2. LLQ pain  Left lower quadrant pain is pretty much resolved-Notes across the upper abdomen  - Comprehensive Metabolic Panel; Future    3. Type 2 diabetes mellitus without  complication, with long-term current use of insulin  (CMD)  Patient is eating very little food at this time and has stopped taking her insulin completely.  I have recommended she start back on her NPH 18 mg twice a day and check her blood sugar so at least she will be getting some insulin hopefully not enough to drop her blood sugars and I have also recommended to her that she eat more when she feels like it-she does not have to eat a lot but she needs to put something in her body.  I will message her in a week to see how she is doing    4. Moderate episode of recurrent major depressive disorder (CMD)  Her depression is definitely a large if not the main component of her problems.  She will start back on her Effexor 150 mg daily.  Phone number provided and consult also placed for counseling             See orders.

## 2025-01-28 PROBLEM — Z87.09 PERSONAL HISTORY OF OTHER DISEASES OF THE RESPIRATORY SYSTEM: Chronic | Status: ACTIVE | Noted: 2024-10-24

## 2025-01-28 PROBLEM — L40.9 PSORIASIS, UNSPECIFIED: Chronic | Status: ACTIVE | Noted: 2024-10-24

## 2025-01-31 ENCOUNTER — APPOINTMENT (OUTPATIENT)
Dept: GASTROENTEROLOGY | Facility: CLINIC | Age: 65
End: 2025-01-31
Payer: COMMERCIAL

## 2025-01-31 DIAGNOSIS — K64.8 OTHER HEMORRHOIDS: ICD-10-CM

## 2025-01-31 DIAGNOSIS — K22.70 BARRETT'S ESOPHAGUS W/OUT DYSPLASIA: ICD-10-CM

## 2025-01-31 DIAGNOSIS — Z86.0100 PERSONAL HISTORY OF COLON POLYPS, UNSPECIFIED: ICD-10-CM

## 2025-01-31 DIAGNOSIS — R19.7 DIARRHEA, UNSPECIFIED: ICD-10-CM

## 2025-01-31 DIAGNOSIS — K44.9 DIAPHRAGMATIC HERNIA W/OUT OBSTRUCTION OR GANGRENE: ICD-10-CM

## 2025-01-31 DIAGNOSIS — K57.30 DIVERTICULOSIS OF LARGE INTESTINE W/OUT PERFORATION OR ABSCESS W/OUT BLEEDING: ICD-10-CM

## 2025-01-31 DIAGNOSIS — Z80.9 FAMILY HISTORY OF MALIGNANT NEOPLASM, UNSPECIFIED: ICD-10-CM

## 2025-01-31 PROCEDURE — 99213 OFFICE O/P EST LOW 20 MIN: CPT | Mod: 93

## 2025-05-05 ENCOUNTER — RX RENEWAL (OUTPATIENT)
Age: 65
End: 2025-05-05

## 2025-05-13 ENCOUNTER — INPATIENT (INPATIENT)
Facility: HOSPITAL | Age: 65
LOS: 2 days | Discharge: HOME | DRG: 392 | End: 2025-05-16
Attending: STUDENT IN AN ORGANIZED HEALTH CARE EDUCATION/TRAINING PROGRAM | Admitting: STUDENT IN AN ORGANIZED HEALTH CARE EDUCATION/TRAINING PROGRAM
Payer: COMMERCIAL

## 2025-05-13 VITALS
TEMPERATURE: 97 F | SYSTOLIC BLOOD PRESSURE: 150 MMHG | RESPIRATION RATE: 17 BRPM | DIASTOLIC BLOOD PRESSURE: 89 MMHG | WEIGHT: 167.55 LBS | OXYGEN SATURATION: 98 % | HEART RATE: 103 BPM

## 2025-05-13 DIAGNOSIS — Z90.49 ACQUIRED ABSENCE OF OTHER SPECIFIED PARTS OF DIGESTIVE TRACT: Chronic | ICD-10-CM

## 2025-05-13 DIAGNOSIS — Z90.79 ACQUIRED ABSENCE OF OTHER GENITAL ORGAN(S): Chronic | ICD-10-CM

## 2025-05-13 DIAGNOSIS — Z98.890 OTHER SPECIFIED POSTPROCEDURAL STATES: Chronic | ICD-10-CM

## 2025-05-13 DIAGNOSIS — K57.80 DIVERTICULITIS OF INTESTINE, PART UNSPECIFIED, WITH PERFORATION AND ABSCESS WITHOUT BLEEDING: ICD-10-CM

## 2025-05-13 LAB
ALBUMIN SERPL ELPH-MCNC: 4 G/DL — SIGNIFICANT CHANGE UP (ref 3.5–5)
ALP SERPL-CCNC: 63 U/L — SIGNIFICANT CHANGE UP (ref 40–120)
ALT FLD-CCNC: 17 U/L DA — SIGNIFICANT CHANGE UP (ref 10–60)
ANION GAP SERPL CALC-SCNC: 5 MMOL/L — SIGNIFICANT CHANGE UP (ref 5–17)
APPEARANCE UR: CLEAR — SIGNIFICANT CHANGE UP
AST SERPL-CCNC: 12 U/L — SIGNIFICANT CHANGE UP (ref 10–40)
BASOPHILS # BLD AUTO: 0.05 K/UL — SIGNIFICANT CHANGE UP (ref 0–0.2)
BASOPHILS NFR BLD AUTO: 0.5 % — SIGNIFICANT CHANGE UP (ref 0–2)
BILIRUB SERPL-MCNC: 0.9 MG/DL — SIGNIFICANT CHANGE UP (ref 0.2–1.2)
BILIRUB UR-MCNC: ABNORMAL
BUN SERPL-MCNC: 15 MG/DL — SIGNIFICANT CHANGE UP (ref 7–18)
CALCIUM SERPL-MCNC: 9.1 MG/DL — SIGNIFICANT CHANGE UP (ref 8.4–10.5)
CHLORIDE SERPL-SCNC: 105 MMOL/L — SIGNIFICANT CHANGE UP (ref 96–108)
CO2 SERPL-SCNC: 24 MMOL/L — SIGNIFICANT CHANGE UP (ref 22–31)
COLOR SPEC: SIGNIFICANT CHANGE UP
CREAT SERPL-MCNC: 0.79 MG/DL — SIGNIFICANT CHANGE UP (ref 0.5–1.3)
DIFF PNL FLD: NEGATIVE — SIGNIFICANT CHANGE UP
EGFR: 100 ML/MIN/1.73M2 — SIGNIFICANT CHANGE UP
EGFR: 100 ML/MIN/1.73M2 — SIGNIFICANT CHANGE UP
EOSINOPHIL # BLD AUTO: 0.04 K/UL — SIGNIFICANT CHANGE UP (ref 0–0.5)
EOSINOPHIL NFR BLD AUTO: 0.4 % — SIGNIFICANT CHANGE UP (ref 0–6)
GLUCOSE SERPL-MCNC: 141 MG/DL — HIGH (ref 70–99)
GLUCOSE UR QL: NEGATIVE MG/DL — SIGNIFICANT CHANGE UP
HCT VFR BLD CALC: 40.9 % — SIGNIFICANT CHANGE UP (ref 39–50)
HGB BLD-MCNC: 14.3 G/DL — SIGNIFICANT CHANGE UP (ref 13–17)
IMM GRANULOCYTES NFR BLD AUTO: 0.5 % — SIGNIFICANT CHANGE UP (ref 0–0.9)
KETONES UR QL: ABNORMAL MG/DL
LACTATE SERPL-SCNC: 1.4 MMOL/L — SIGNIFICANT CHANGE UP (ref 0.7–2)
LEUKOCYTE ESTERASE UR-ACNC: ABNORMAL
LIDOCAIN IGE QN: 24 U/L — SIGNIFICANT CHANGE UP (ref 13–75)
LYMPHOCYTES # BLD AUTO: 1.24 K/UL — SIGNIFICANT CHANGE UP (ref 1–3.3)
LYMPHOCYTES # BLD AUTO: 11.3 % — LOW (ref 13–44)
MCHC RBC-ENTMCNC: 28.5 PG — SIGNIFICANT CHANGE UP (ref 27–34)
MCHC RBC-ENTMCNC: 35 G/DL — SIGNIFICANT CHANGE UP (ref 32–36)
MCV RBC AUTO: 81.5 FL — SIGNIFICANT CHANGE UP (ref 80–100)
MONOCYTES # BLD AUTO: 0.94 K/UL — HIGH (ref 0–0.9)
MONOCYTES NFR BLD AUTO: 8.6 % — SIGNIFICANT CHANGE UP (ref 2–14)
NEUTROPHILS # BLD AUTO: 8.61 K/UL — HIGH (ref 1.8–7.4)
NEUTROPHILS NFR BLD AUTO: 78.7 % — HIGH (ref 43–77)
NITRITE UR-MCNC: POSITIVE
NRBC BLD AUTO-RTO: 0 /100 WBCS — SIGNIFICANT CHANGE UP (ref 0–0)
PH UR: 5 — SIGNIFICANT CHANGE UP (ref 5–8)
PLATELET # BLD AUTO: 220 K/UL — SIGNIFICANT CHANGE UP (ref 150–400)
POTASSIUM SERPL-MCNC: 3.6 MMOL/L — SIGNIFICANT CHANGE UP (ref 3.5–5.3)
POTASSIUM SERPL-SCNC: 3.6 MMOL/L — SIGNIFICANT CHANGE UP (ref 3.5–5.3)
PROT SERPL-MCNC: 8 G/DL — SIGNIFICANT CHANGE UP (ref 6–8.3)
PROT UR-MCNC: 30 MG/DL
RBC # BLD: 5.02 M/UL — SIGNIFICANT CHANGE UP (ref 4.2–5.8)
RBC # FLD: 14.6 % — HIGH (ref 10.3–14.5)
SODIUM SERPL-SCNC: 134 MMOL/L — LOW (ref 135–145)
SP GR SPEC: 1.03 — SIGNIFICANT CHANGE UP (ref 1–1.03)
UROBILINOGEN FLD QL: 1 MG/DL — SIGNIFICANT CHANGE UP (ref 0.2–1)
WBC # BLD: 10.94 K/UL — HIGH (ref 3.8–10.5)
WBC # FLD AUTO: 10.94 K/UL — HIGH (ref 3.8–10.5)

## 2025-05-13 PROCEDURE — 99285 EMERGENCY DEPT VISIT HI MDM: CPT

## 2025-05-13 PROCEDURE — 74177 CT ABD & PELVIS W/CONTRAST: CPT | Mod: 26

## 2025-05-13 PROCEDURE — 99222 1ST HOSP IP/OBS MODERATE 55: CPT

## 2025-05-13 RX ORDER — METRONIDAZOLE 250 MG
500 TABLET ORAL EVERY 8 HOURS
Refills: 0 | Status: DISCONTINUED | OUTPATIENT
Start: 2025-05-13 | End: 2025-05-16

## 2025-05-13 RX ORDER — GABAPENTIN 400 MG/1
1 CAPSULE ORAL
Refills: 0 | DISCHARGE

## 2025-05-13 RX ORDER — ACETAMINOPHEN 500 MG/5ML
1000 LIQUID (ML) ORAL ONCE
Refills: 0 | Status: COMPLETED | OUTPATIENT
Start: 2025-05-13 | End: 2025-05-14

## 2025-05-13 RX ORDER — DIPHENHYDRAMINE HCL 12.5MG/5ML
25 ELIXIR ORAL ONCE
Refills: 0 | Status: COMPLETED | OUTPATIENT
Start: 2025-05-13 | End: 2025-05-13

## 2025-05-13 RX ORDER — ACETAMINOPHEN 500 MG/5ML
650 LIQUID (ML) ORAL EVERY 6 HOURS
Refills: 0 | Status: DISCONTINUED | OUTPATIENT
Start: 2025-05-13 | End: 2025-05-13

## 2025-05-13 RX ORDER — KETOROLAC TROMETHAMINE 30 MG/ML
15 INJECTION, SOLUTION INTRAMUSCULAR; INTRAVENOUS ONCE
Refills: 0 | Status: DISCONTINUED | OUTPATIENT
Start: 2025-05-13 | End: 2025-05-13

## 2025-05-13 RX ORDER — SODIUM CHLORIDE 9 G/1000ML
1000 INJECTION, SOLUTION INTRAVENOUS
Refills: 0 | Status: DISCONTINUED | OUTPATIENT
Start: 2025-05-13 | End: 2025-05-15

## 2025-05-13 RX ORDER — GABAPENTIN 400 MG/1
300 CAPSULE ORAL THREE TIMES A DAY
Refills: 0 | Status: DISCONTINUED | OUTPATIENT
Start: 2025-05-13 | End: 2025-05-16

## 2025-05-13 RX ORDER — ATORVASTATIN CALCIUM 80 MG/1
40 TABLET, FILM COATED ORAL AT BEDTIME
Refills: 0 | Status: DISCONTINUED | OUTPATIENT
Start: 2025-05-13 | End: 2025-05-16

## 2025-05-13 RX ORDER — SERTRALINE 100 MG/1
2 TABLET, FILM COATED ORAL
Refills: 0 | DISCHARGE

## 2025-05-13 RX ORDER — DIATRIZOATE MEGLUMINE, SODIUM 66 %-10 %
30 VIAL (ML) INJECTION ONCE
Refills: 0 | Status: COMPLETED | OUTPATIENT
Start: 2025-05-13 | End: 2025-05-13

## 2025-05-13 RX ORDER — PIPERACILLIN-TAZO-DEXTROSE,ISO 2.25G/50ML
3.38 IV SOLUTION, PIGGYBACK PREMIX FROZEN(ML) INTRAVENOUS ONCE
Refills: 0 | Status: COMPLETED | OUTPATIENT
Start: 2025-05-13 | End: 2025-05-13

## 2025-05-13 RX ORDER — CEFTRIAXONE 500 MG/1
1000 INJECTION, POWDER, FOR SOLUTION INTRAMUSCULAR; INTRAVENOUS EVERY 24 HOURS
Refills: 0 | Status: DISCONTINUED | OUTPATIENT
Start: 2025-05-13 | End: 2025-05-16

## 2025-05-13 RX ORDER — PROPRANOLOL HCL 60 MG
1 TABLET ORAL
Refills: 0 | DISCHARGE

## 2025-05-13 RX ORDER — HEPARIN SODIUM 1000 [USP'U]/ML
5000 INJECTION INTRAVENOUS; SUBCUTANEOUS EVERY 8 HOURS
Refills: 0 | Status: DISCONTINUED | OUTPATIENT
Start: 2025-05-13 | End: 2025-05-16

## 2025-05-13 RX ORDER — ONDANSETRON HCL/PF 4 MG/2 ML
4 VIAL (ML) INJECTION ONCE
Refills: 0 | Status: DISCONTINUED | OUTPATIENT
Start: 2025-05-13 | End: 2025-05-16

## 2025-05-13 RX ORDER — HYDROMORPHONE/SOD CHLOR,ISO/PF 2 MG/10 ML
0.5 SYRINGE (ML) INJECTION ONCE
Refills: 0 | Status: DISCONTINUED | OUTPATIENT
Start: 2025-05-13 | End: 2025-05-13

## 2025-05-13 RX ORDER — SERTRALINE 100 MG/1
200 TABLET, FILM COATED ORAL DAILY
Refills: 0 | Status: DISCONTINUED | OUTPATIENT
Start: 2025-05-13 | End: 2025-05-16

## 2025-05-13 RX ORDER — ATORVASTATIN CALCIUM 80 MG/1
1 TABLET, FILM COATED ORAL
Refills: 0 | DISCHARGE

## 2025-05-13 RX ADMIN — Medication 0.5 MILLIGRAM(S): at 23:00

## 2025-05-13 RX ADMIN — GABAPENTIN 300 MILLIGRAM(S): 400 CAPSULE ORAL at 21:19

## 2025-05-13 RX ADMIN — Medication 100 MILLIGRAM(S): at 21:16

## 2025-05-13 RX ADMIN — Medication 1 APPLICATION(S): at 12:39

## 2025-05-13 RX ADMIN — KETOROLAC TROMETHAMINE 15 MILLIGRAM(S): 30 INJECTION, SOLUTION INTRAMUSCULAR; INTRAVENOUS at 08:01

## 2025-05-13 RX ADMIN — KETOROLAC TROMETHAMINE 15 MILLIGRAM(S): 30 INJECTION, SOLUTION INTRAMUSCULAR; INTRAVENOUS at 18:32

## 2025-05-13 RX ADMIN — Medication 25 MILLIGRAM(S): at 13:17

## 2025-05-13 RX ADMIN — Medication 4 MILLIGRAM(S): at 10:05

## 2025-05-13 RX ADMIN — SODIUM CHLORIDE 115 MILLILITER(S): 9 INJECTION, SOLUTION INTRAVENOUS at 22:27

## 2025-05-13 RX ADMIN — Medication 200 GRAM(S): at 12:25

## 2025-05-13 RX ADMIN — SERTRALINE 200 MILLIGRAM(S): 100 TABLET, FILM COATED ORAL at 12:38

## 2025-05-13 RX ADMIN — SODIUM CHLORIDE 115 MILLILITER(S): 9 INJECTION, SOLUTION INTRAVENOUS at 13:16

## 2025-05-13 RX ADMIN — Medication 100 MILLIGRAM(S): at 14:40

## 2025-05-13 RX ADMIN — Medication 4 MILLIGRAM(S): at 09:46

## 2025-05-13 RX ADMIN — ATORVASTATIN CALCIUM 40 MILLIGRAM(S): 80 TABLET, FILM COATED ORAL at 21:17

## 2025-05-13 RX ADMIN — Medication 4 MILLIGRAM(S): at 17:04

## 2025-05-13 RX ADMIN — KETOROLAC TROMETHAMINE 15 MILLIGRAM(S): 30 INJECTION, SOLUTION INTRAMUSCULAR; INTRAVENOUS at 14:39

## 2025-05-13 RX ADMIN — HEPARIN SODIUM 5000 UNIT(S): 1000 INJECTION INTRAVENOUS; SUBCUTANEOUS at 21:17

## 2025-05-13 RX ADMIN — Medication 30 MILLILITER(S): at 08:06

## 2025-05-13 RX ADMIN — Medication 0.5 MILLIGRAM(S): at 22:27

## 2025-05-13 RX ADMIN — Medication 4 MILLIGRAM(S): at 16:34

## 2025-05-13 RX ADMIN — SODIUM CHLORIDE 115 MILLILITER(S): 9 INJECTION, SOLUTION INTRAVENOUS at 12:26

## 2025-05-13 RX ADMIN — KETOROLAC TROMETHAMINE 15 MILLIGRAM(S): 30 INJECTION, SOLUTION INTRAMUSCULAR; INTRAVENOUS at 10:05

## 2025-05-13 RX ADMIN — CEFTRIAXONE 100 MILLIGRAM(S): 500 INJECTION, POWDER, FOR SOLUTION INTRAMUSCULAR; INTRAVENOUS at 13:00

## 2025-05-13 RX ADMIN — Medication 650 MILLIGRAM(S): at 13:00

## 2025-05-13 RX ADMIN — GABAPENTIN 300 MILLIGRAM(S): 400 CAPSULE ORAL at 14:12

## 2025-05-13 RX ADMIN — Medication 1000 MILLILITER(S): at 08:04

## 2025-05-13 RX ADMIN — Medication 650 MILLIGRAM(S): at 12:37

## 2025-05-13 NOTE — ED PROVIDER NOTE - CLINICAL SUMMARY MEDICAL DECISION MAKING FREE TEXT BOX
64-year-old male presenting with persistent abdominal pain.  Recent treatment for diverticulitis.  Symptoms may suggest viral gastroenteritis, diverticulitis refractory to oral antibiotics, UTI, kidney stones, pancreatitis among other causes.  Plan to perform labs urinalysis, CT abdomen pelvis and reassess.

## 2025-05-13 NOTE — H&P ADULT - ASSESSMENT
63 y/o male with CT reading: Suspected perforated acute appendicitis versus perforated diverticulitis of the terminal ileum. The perforation appears contained to the right lower quadrant.  Endorses history of open appendectomy in 2000  -Admit to surgery under Dr. Hackett  -NPO, IVF  -IV abx  -serial abdominal exams  -observation, conservative management at this time   -dvt ppx/oob  -discussed with Dr. Hackett

## 2025-05-13 NOTE — H&P ADULT - NSICDXPASTSURGICALHX_GEN_ALL_CORE_FT
PAST SURGICAL HISTORY:  H/O hernia repair     History of appendectomy     History of cholecystectomy     S/P orchiectomy

## 2025-05-13 NOTE — H&P ADULT - NSHPPHYSICALEXAM_GEN_ALL_CORE
General:  Appears stated age, well-groomed, no distress  Eyes: EOMI  HENT:  WNL, no JVD  Respirations: Unlabored  Abdomen: soft, non distended, nontender, no rebound, no guarding, no peritonitis   Extremities: no edema bilaterally  Skin: warm and dry  Musculoskeletal: no calf tenderness b/l   Neuro:  Alert, oriented to time, place and person   Psych: normal affect

## 2025-05-13 NOTE — ED PROVIDER NOTE - OBJECTIVE STATEMENT
64-year-old male history of fatty liver, COPD, diverticulitis presenting with persistent abdominal pain and nonbloody diarrhea despite finishing 7-day course of Cipro/Flagyl today.  Patient originally had pain to the left side of the abdomen and loose brown stools.  Was prescribed antibiotics by PCP Dr. Parsons.  States that his pain has persisted but is now on the right side.  Otherwise denies any nausea, fever or urinary symptoms.  Has been passing gas.  PSH of cholecystectomy and hernia repair

## 2025-05-13 NOTE — H&P ADULT - HISTORY OF PRESENT ILLNESS
65 y/o male hx COPD, HLD, pre-diabetes and psoriasis, presenting with one week of abdominal pain now localized in the rlq. Pt reports surgical history of open appendectomy in the year 2000, two days later had a lap [place], 2001 incisional hernia repair with mesh. One week ago pt had LLQ pain and called his PCP who prescribed a one week course of PO antibiotics for diverticulitis. Pt reports that the pain waxed and waned over the course of the week but now worsened and localized in the RLQ. Fever earlier in the week but afebrile the last 24 hours. Seen and examined, wife at bedside. Denies chest pain, shortness of breath, fever/chills, change in bowel or bladder function.

## 2025-05-13 NOTE — ED ADULT TRIAGE NOTE - CHIEF COMPLAINT QUOTE
abdominal pain for 5 days with diarrhea, nausea, treated with diverticulitis 5th day of antibiotics today cipro and metronidazole

## 2025-05-13 NOTE — H&P ADULT - NSHPLABSRESULTS_GEN_ALL_CORE
14.3   10.94 )-----------( 220      ( 13 May 2025 07:33 )             40.9   05-13    134[L]  |  105  |  15  ----------------------------<  141[H]  3.6   |  24  |  0.79    Ca    9.1      13 May 2025 07:33    TPro  8.0  /  Alb  4.0  /  TBili  0.9  /  DBili  x   /  AST  12  /  ALT  17  /  AlkPhos  63  05-13    < from: CT Abdomen and Pelvis w/ Oral Cont and w/ IV Cont (05.13.25 @ 09:37) >      IMPRESSION:  Suspected perforated acute appendicitis versus perforated diverticulitis   of the terminal ileum. The perforation appears contained to the right   lower quadrant.    Findings relayed via secured teams chat, to Dr. MADDIE SANTOS   [phone] 5/13/2025 10:23 AM by Dr. Nancy Bartholomew with read back   confirmation.    --- Endof Report ---             REUBEN BARTHOLOMEW MD; Attending Radiologist  This document has been electronically signed. May 13 2025 10:24AM    < end of copied text >

## 2025-05-13 NOTE — PATIENT PROFILE ADULT - NSPROPTRIGHTCAREGIVER_GEN_A_NUR
Regarding: [place]-cold feet & hands,shortness of breaath,sharp pain inin left calf down to foot possible blood clot  ----- Message from Ed Coker sent at 10/27/2021  7:35 AM CDT -----  Patient Name: Sebastian Almonte    Full Name of Provider seen for current symptoms:Maria R Schaffer      Pregnant (If Yes, how long?):NA    Symptoms: cold feet & hands,shortness of breaath,sharp pain inin left calf down to foot possible blood clot , cant put pressure on foot    Do you or any of your household members have the following symptoms:  Fever >100.0#F or >38.0#C: No    New or worsening cough, shortness of breath, sore throat, congestion, or runny nose: Yes    New onset of nausea, vomiting or diarrhea: Yes    New onset of loss of taste or smell, chills, repeated shaking with chills, muscle pain, or headache: Yes    Have you, a household member, or another person you have been in contact with tested positive for COVID-19 in the last 14 days?: No    Call Back #:[phone]    Call Center Account # for provider seen for current symptoms:[id number]    Which State are you currently located in? (enter State name in Summary field): [place]     no

## 2025-05-13 NOTE — H&P ADULT - NS ATTEND AMEND GEN_ALL_CORE FT
64M presenting with acute abdominal pain to ER mostly right sided. He has hx of open appendectomy, cholecystectomy, incisional hernia repair with mesh of RLQ incision, hx of intestinal dysplasia of the esophagus, COPD, diverticulitis of left colon, hx of recent EGD and colonoscopy showing pan-diverticulosis. On exam he is tender in the RLQ, non distended, mild guarding but no gabriella peritonitis. He appears non toxic, afebrile hemodynamically stable. Review of his CT scan demonstrates perforation of terminal ileus vs right sided diverticulitis. wbc 11k.     Admit, NPO, IVFs, IV rocephin and flagyl  Non-operative management given contained perforation

## 2025-05-13 NOTE — PATIENT PROFILE ADULT - FALL HARM RISK - HARM RISK INTERVENTIONS

## 2025-05-14 LAB
ANION GAP SERPL CALC-SCNC: 8 MMOL/L — SIGNIFICANT CHANGE UP (ref 5–17)
BASOPHILS # BLD AUTO: 0.05 K/UL — SIGNIFICANT CHANGE UP (ref 0–0.2)
BASOPHILS NFR BLD AUTO: 0.8 % — SIGNIFICANT CHANGE UP (ref 0–2)
BUN SERPL-MCNC: 10 MG/DL — SIGNIFICANT CHANGE UP (ref 7–18)
CALCIUM SERPL-MCNC: 8.5 MG/DL — SIGNIFICANT CHANGE UP (ref 8.4–10.5)
CHLORIDE SERPL-SCNC: 104 MMOL/L — SIGNIFICANT CHANGE UP (ref 96–108)
CO2 SERPL-SCNC: 24 MMOL/L — SIGNIFICANT CHANGE UP (ref 22–31)
CREAT SERPL-MCNC: 0.66 MG/DL — SIGNIFICANT CHANGE UP (ref 0.5–1.3)
EGFR: 106 ML/MIN/1.73M2 — SIGNIFICANT CHANGE UP
EGFR: 106 ML/MIN/1.73M2 — SIGNIFICANT CHANGE UP
EOSINOPHIL # BLD AUTO: 0.12 K/UL — SIGNIFICANT CHANGE UP (ref 0–0.5)
EOSINOPHIL NFR BLD AUTO: 1.8 % — SIGNIFICANT CHANGE UP (ref 0–6)
GLUCOSE SERPL-MCNC: 140 MG/DL — HIGH (ref 70–99)
HCT VFR BLD CALC: 37.6 % — LOW (ref 39–50)
HGB BLD-MCNC: 12.9 G/DL — LOW (ref 13–17)
IMM GRANULOCYTES NFR BLD AUTO: 0.5 % — SIGNIFICANT CHANGE UP (ref 0–0.9)
LYMPHOCYTES # BLD AUTO: 1.44 K/UL — SIGNIFICANT CHANGE UP (ref 1–3.3)
LYMPHOCYTES # BLD AUTO: 21.7 % — SIGNIFICANT CHANGE UP (ref 13–44)
MCHC RBC-ENTMCNC: 28.4 PG — SIGNIFICANT CHANGE UP (ref 27–34)
MCHC RBC-ENTMCNC: 34.3 G/DL — SIGNIFICANT CHANGE UP (ref 32–36)
MCV RBC AUTO: 82.8 FL — SIGNIFICANT CHANGE UP (ref 80–100)
MONOCYTES # BLD AUTO: 0.47 K/UL — SIGNIFICANT CHANGE UP (ref 0–0.9)
MONOCYTES NFR BLD AUTO: 7.1 % — SIGNIFICANT CHANGE UP (ref 2–14)
NEUTROPHILS # BLD AUTO: 4.53 K/UL — SIGNIFICANT CHANGE UP (ref 1.8–7.4)
NEUTROPHILS NFR BLD AUTO: 68.1 % — SIGNIFICANT CHANGE UP (ref 43–77)
NRBC BLD AUTO-RTO: 0 /100 WBCS — SIGNIFICANT CHANGE UP (ref 0–0)
PLATELET # BLD AUTO: 210 K/UL — SIGNIFICANT CHANGE UP (ref 150–400)
POTASSIUM SERPL-MCNC: 3.9 MMOL/L — SIGNIFICANT CHANGE UP (ref 3.5–5.3)
POTASSIUM SERPL-SCNC: 3.9 MMOL/L — SIGNIFICANT CHANGE UP (ref 3.5–5.3)
RBC # BLD: 4.54 M/UL — SIGNIFICANT CHANGE UP (ref 4.2–5.8)
RBC # FLD: 14.8 % — HIGH (ref 10.3–14.5)
SODIUM SERPL-SCNC: 136 MMOL/L — SIGNIFICANT CHANGE UP (ref 135–145)
WBC # BLD: 6.64 K/UL — SIGNIFICANT CHANGE UP (ref 3.8–10.5)
WBC # FLD AUTO: 6.64 K/UL — SIGNIFICANT CHANGE UP (ref 3.8–10.5)

## 2025-05-14 PROCEDURE — 99232 SBSQ HOSP IP/OBS MODERATE 35: CPT

## 2025-05-14 RX ORDER — HYDROMORPHONE/SOD CHLOR,ISO/PF 2 MG/10 ML
0.5 SYRINGE (ML) INJECTION ONCE
Refills: 0 | Status: DISCONTINUED | OUTPATIENT
Start: 2025-05-14 | End: 2025-05-14

## 2025-05-14 RX ORDER — HYDROMORPHONE/SOD CHLOR,ISO/PF 2 MG/10 ML
0.5 SYRINGE (ML) INJECTION EVERY 6 HOURS
Refills: 0 | Status: DISCONTINUED | OUTPATIENT
Start: 2025-05-14 | End: 2025-05-16

## 2025-05-14 RX ORDER — ACETAMINOPHEN 500 MG/5ML
1000 LIQUID (ML) ORAL EVERY 8 HOURS
Refills: 0 | Status: DISCONTINUED | OUTPATIENT
Start: 2025-05-14 | End: 2025-05-16

## 2025-05-14 RX ADMIN — GABAPENTIN 300 MILLIGRAM(S): 400 CAPSULE ORAL at 05:03

## 2025-05-14 RX ADMIN — Medication 40 MILLIGRAM(S): at 06:38

## 2025-05-14 RX ADMIN — Medication 0.5 MILLIGRAM(S): at 22:46

## 2025-05-14 RX ADMIN — Medication 0.5 MILLIGRAM(S): at 08:45

## 2025-05-14 RX ADMIN — ATORVASTATIN CALCIUM 40 MILLIGRAM(S): 80 TABLET, FILM COATED ORAL at 21:15

## 2025-05-14 RX ADMIN — Medication 100 MILLIGRAM(S): at 16:16

## 2025-05-14 RX ADMIN — Medication 0.5 MILLIGRAM(S): at 23:30

## 2025-05-14 RX ADMIN — Medication 0.5 MILLIGRAM(S): at 12:37

## 2025-05-14 RX ADMIN — SODIUM CHLORIDE 115 MILLILITER(S): 9 INJECTION, SOLUTION INTRAVENOUS at 05:29

## 2025-05-14 RX ADMIN — Medication 100 MILLIGRAM(S): at 23:07

## 2025-05-14 RX ADMIN — Medication 400 MILLIGRAM(S): at 05:03

## 2025-05-14 RX ADMIN — CEFTRIAXONE 100 MILLIGRAM(S): 500 INJECTION, POWDER, FOR SOLUTION INTRAMUSCULAR; INTRAVENOUS at 12:55

## 2025-05-14 RX ADMIN — Medication 0.5 MILLIGRAM(S): at 11:10

## 2025-05-14 RX ADMIN — GABAPENTIN 300 MILLIGRAM(S): 400 CAPSULE ORAL at 13:17

## 2025-05-14 RX ADMIN — HEPARIN SODIUM 5000 UNIT(S): 1000 INJECTION INTRAVENOUS; SUBCUTANEOUS at 05:03

## 2025-05-14 RX ADMIN — SERTRALINE 200 MILLIGRAM(S): 100 TABLET, FILM COATED ORAL at 11:10

## 2025-05-14 RX ADMIN — HEPARIN SODIUM 5000 UNIT(S): 1000 INJECTION INTRAVENOUS; SUBCUTANEOUS at 21:16

## 2025-05-14 RX ADMIN — Medication 0.5 MILLIGRAM(S): at 06:59

## 2025-05-14 RX ADMIN — GABAPENTIN 300 MILLIGRAM(S): 400 CAPSULE ORAL at 21:15

## 2025-05-14 RX ADMIN — Medication 1000 MILLIGRAM(S): at 06:00

## 2025-05-14 RX ADMIN — Medication 100 MILLIGRAM(S): at 05:29

## 2025-05-14 RX ADMIN — HEPARIN SODIUM 5000 UNIT(S): 1000 INJECTION INTRAVENOUS; SUBCUTANEOUS at 13:17

## 2025-05-15 LAB
ANION GAP SERPL CALC-SCNC: 7 MMOL/L — SIGNIFICANT CHANGE UP (ref 5–17)
BUN SERPL-MCNC: 5 MG/DL — LOW (ref 7–18)
CALCIUM SERPL-MCNC: 8.7 MG/DL — SIGNIFICANT CHANGE UP (ref 8.4–10.5)
CHLORIDE SERPL-SCNC: 105 MMOL/L — SIGNIFICANT CHANGE UP (ref 96–108)
CO2 SERPL-SCNC: 26 MMOL/L — SIGNIFICANT CHANGE UP (ref 22–31)
CREAT SERPL-MCNC: 0.68 MG/DL — SIGNIFICANT CHANGE UP (ref 0.5–1.3)
EGFR: 105 ML/MIN/1.73M2 — SIGNIFICANT CHANGE UP
EGFR: 105 ML/MIN/1.73M2 — SIGNIFICANT CHANGE UP
GLUCOSE SERPL-MCNC: 122 MG/DL — HIGH (ref 70–99)
HCT VFR BLD CALC: 36.5 % — LOW (ref 39–50)
HGB BLD-MCNC: 12.3 G/DL — LOW (ref 13–17)
MCHC RBC-ENTMCNC: 28.5 PG — SIGNIFICANT CHANGE UP (ref 27–34)
MCHC RBC-ENTMCNC: 33.7 G/DL — SIGNIFICANT CHANGE UP (ref 32–36)
MCV RBC AUTO: 84.5 FL — SIGNIFICANT CHANGE UP (ref 80–100)
NRBC BLD AUTO-RTO: 0 /100 WBCS — SIGNIFICANT CHANGE UP (ref 0–0)
PLATELET # BLD AUTO: 196 K/UL — SIGNIFICANT CHANGE UP (ref 150–400)
POTASSIUM SERPL-MCNC: 3.8 MMOL/L — SIGNIFICANT CHANGE UP (ref 3.5–5.3)
POTASSIUM SERPL-SCNC: 3.8 MMOL/L — SIGNIFICANT CHANGE UP (ref 3.5–5.3)
RBC # BLD: 4.32 M/UL — SIGNIFICANT CHANGE UP (ref 4.2–5.8)
RBC # FLD: 14.6 % — HIGH (ref 10.3–14.5)
SODIUM SERPL-SCNC: 138 MMOL/L — SIGNIFICANT CHANGE UP (ref 135–145)
WBC # BLD: 4.88 K/UL — SIGNIFICANT CHANGE UP (ref 3.8–10.5)
WBC # FLD AUTO: 4.88 K/UL — SIGNIFICANT CHANGE UP (ref 3.8–10.5)

## 2025-05-15 PROCEDURE — 99232 SBSQ HOSP IP/OBS MODERATE 35: CPT

## 2025-05-15 RX ADMIN — Medication 100 MILLIGRAM(S): at 15:19

## 2025-05-15 RX ADMIN — SERTRALINE 200 MILLIGRAM(S): 100 TABLET, FILM COATED ORAL at 11:46

## 2025-05-15 RX ADMIN — Medication 0.5 MILLIGRAM(S): at 23:37

## 2025-05-15 RX ADMIN — ATORVASTATIN CALCIUM 40 MILLIGRAM(S): 80 TABLET, FILM COATED ORAL at 22:00

## 2025-05-15 RX ADMIN — GABAPENTIN 300 MILLIGRAM(S): 400 CAPSULE ORAL at 21:59

## 2025-05-15 RX ADMIN — Medication 0.5 MILLIGRAM(S): at 12:50

## 2025-05-15 RX ADMIN — GABAPENTIN 300 MILLIGRAM(S): 400 CAPSULE ORAL at 15:19

## 2025-05-15 RX ADMIN — CEFTRIAXONE 100 MILLIGRAM(S): 500 INJECTION, POWDER, FOR SOLUTION INTRAMUSCULAR; INTRAVENOUS at 13:53

## 2025-05-15 RX ADMIN — HEPARIN SODIUM 5000 UNIT(S): 1000 INJECTION INTRAVENOUS; SUBCUTANEOUS at 22:00

## 2025-05-15 RX ADMIN — Medication 40 MILLIGRAM(S): at 06:07

## 2025-05-15 RX ADMIN — Medication 100 MILLIGRAM(S): at 06:07

## 2025-05-15 RX ADMIN — GABAPENTIN 300 MILLIGRAM(S): 400 CAPSULE ORAL at 05:27

## 2025-05-15 RX ADMIN — Medication 0.5 MILLIGRAM(S): at 05:28

## 2025-05-15 RX ADMIN — HEPARIN SODIUM 5000 UNIT(S): 1000 INJECTION INTRAVENOUS; SUBCUTANEOUS at 15:19

## 2025-05-15 RX ADMIN — HEPARIN SODIUM 5000 UNIT(S): 1000 INJECTION INTRAVENOUS; SUBCUTANEOUS at 05:28

## 2025-05-15 RX ADMIN — Medication 0.5 MILLIGRAM(S): at 06:20

## 2025-05-15 RX ADMIN — Medication 0.5 MILLIGRAM(S): at 11:46

## 2025-05-15 RX ADMIN — Medication 0.5 MILLIGRAM(S): at 18:20

## 2025-05-15 RX ADMIN — Medication 100 MILLIGRAM(S): at 21:59

## 2025-05-15 RX ADMIN — Medication 0.5 MILLIGRAM(S): at 17:31

## 2025-05-16 ENCOUNTER — TRANSCRIPTION ENCOUNTER (OUTPATIENT)
Age: 65
End: 2025-05-16

## 2025-05-16 VITALS
DIASTOLIC BLOOD PRESSURE: 70 MMHG | SYSTOLIC BLOOD PRESSURE: 129 MMHG | RESPIRATION RATE: 17 BRPM | TEMPERATURE: 98 F | HEART RATE: 81 BPM | OXYGEN SATURATION: 96 %

## 2025-05-16 LAB
ANION GAP SERPL CALC-SCNC: 7 MMOL/L — SIGNIFICANT CHANGE UP (ref 5–17)
BASOPHILS # BLD AUTO: 0.05 K/UL — SIGNIFICANT CHANGE UP (ref 0–0.2)
BASOPHILS NFR BLD AUTO: 1 % — SIGNIFICANT CHANGE UP (ref 0–2)
BUN SERPL-MCNC: 9 MG/DL — SIGNIFICANT CHANGE UP (ref 7–18)
CALCIUM SERPL-MCNC: 8.5 MG/DL — SIGNIFICANT CHANGE UP (ref 8.4–10.5)
CHLORIDE SERPL-SCNC: 105 MMOL/L — SIGNIFICANT CHANGE UP (ref 96–108)
CO2 SERPL-SCNC: 26 MMOL/L — SIGNIFICANT CHANGE UP (ref 22–31)
CREAT SERPL-MCNC: 0.57 MG/DL — SIGNIFICANT CHANGE UP (ref 0.5–1.3)
EGFR: 111 ML/MIN/1.73M2 — SIGNIFICANT CHANGE UP
EGFR: 111 ML/MIN/1.73M2 — SIGNIFICANT CHANGE UP
EOSINOPHIL # BLD AUTO: 0.21 K/UL — SIGNIFICANT CHANGE UP (ref 0–0.5)
EOSINOPHIL NFR BLD AUTO: 4.2 % — SIGNIFICANT CHANGE UP (ref 0–6)
GLUCOSE SERPL-MCNC: 130 MG/DL — HIGH (ref 70–99)
HCT VFR BLD CALC: 34.5 % — LOW (ref 39–50)
HGB BLD-MCNC: 12.3 G/DL — LOW (ref 13–17)
IMM GRANULOCYTES NFR BLD AUTO: 0.4 % — SIGNIFICANT CHANGE UP (ref 0–0.9)
LYMPHOCYTES # BLD AUTO: 1.53 K/UL — SIGNIFICANT CHANGE UP (ref 1–3.3)
LYMPHOCYTES # BLD AUTO: 30.5 % — SIGNIFICANT CHANGE UP (ref 13–44)
MCHC RBC-ENTMCNC: 28.8 PG — SIGNIFICANT CHANGE UP (ref 27–34)
MCHC RBC-ENTMCNC: 35.7 G/DL — SIGNIFICANT CHANGE UP (ref 32–36)
MCV RBC AUTO: 80.8 FL — SIGNIFICANT CHANGE UP (ref 80–100)
MONOCYTES # BLD AUTO: 0.44 K/UL — SIGNIFICANT CHANGE UP (ref 0–0.9)
MONOCYTES NFR BLD AUTO: 8.8 % — SIGNIFICANT CHANGE UP (ref 2–14)
NEUTROPHILS # BLD AUTO: 2.77 K/UL — SIGNIFICANT CHANGE UP (ref 1.8–7.4)
NEUTROPHILS NFR BLD AUTO: 55.1 % — SIGNIFICANT CHANGE UP (ref 43–77)
NRBC BLD AUTO-RTO: 0 /100 WBCS — SIGNIFICANT CHANGE UP (ref 0–0)
PLATELET # BLD AUTO: 190 K/UL — SIGNIFICANT CHANGE UP (ref 150–400)
POTASSIUM SERPL-MCNC: 3.1 MMOL/L — LOW (ref 3.5–5.3)
POTASSIUM SERPL-SCNC: 3.1 MMOL/L — LOW (ref 3.5–5.3)
RBC # BLD: 4.27 M/UL — SIGNIFICANT CHANGE UP (ref 4.2–5.8)
RBC # FLD: 14.6 % — HIGH (ref 10.3–14.5)
SODIUM SERPL-SCNC: 138 MMOL/L — SIGNIFICANT CHANGE UP (ref 135–145)
WBC # BLD: 5.02 K/UL — SIGNIFICANT CHANGE UP (ref 3.8–10.5)
WBC # FLD AUTO: 5.02 K/UL — SIGNIFICANT CHANGE UP (ref 3.8–10.5)

## 2025-05-16 PROCEDURE — 36415 COLL VENOUS BLD VENIPUNCTURE: CPT

## 2025-05-16 PROCEDURE — 85027 COMPLETE CBC AUTOMATED: CPT

## 2025-05-16 PROCEDURE — 81001 URINALYSIS AUTO W/SCOPE: CPT

## 2025-05-16 PROCEDURE — 99285 EMERGENCY DEPT VISIT HI MDM: CPT | Mod: 25

## 2025-05-16 PROCEDURE — 80053 COMPREHEN METABOLIC PANEL: CPT

## 2025-05-16 PROCEDURE — 96375 TX/PRO/DX INJ NEW DRUG ADDON: CPT

## 2025-05-16 PROCEDURE — 83605 ASSAY OF LACTIC ACID: CPT

## 2025-05-16 PROCEDURE — 80048 BASIC METABOLIC PNL TOTAL CA: CPT

## 2025-05-16 PROCEDURE — 83690 ASSAY OF LIPASE: CPT

## 2025-05-16 PROCEDURE — 74177 CT ABD & PELVIS W/CONTRAST: CPT

## 2025-05-16 PROCEDURE — 85025 COMPLETE CBC W/AUTO DIFF WBC: CPT

## 2025-05-16 PROCEDURE — 96374 THER/PROPH/DIAG INJ IV PUSH: CPT

## 2025-05-16 PROCEDURE — 99232 SBSQ HOSP IP/OBS MODERATE 35: CPT

## 2025-05-16 RX ORDER — METRONIDAZOLE 250 MG
1 TABLET ORAL
Qty: 28 | Refills: 0
Start: 2025-05-16 | End: 2025-05-29

## 2025-05-16 RX ORDER — CEFPODOXIME PROXETIL 200 MG/1
2 TABLET, FILM COATED ORAL
Qty: 56 | Refills: 0
Start: 2025-05-16 | End: 2025-05-29

## 2025-05-16 RX ADMIN — Medication 100 MILLIGRAM(S): at 06:08

## 2025-05-16 RX ADMIN — Medication 0.5 MILLIGRAM(S): at 12:08

## 2025-05-16 RX ADMIN — GABAPENTIN 300 MILLIGRAM(S): 400 CAPSULE ORAL at 06:08

## 2025-05-16 RX ADMIN — HEPARIN SODIUM 5000 UNIT(S): 1000 INJECTION INTRAVENOUS; SUBCUTANEOUS at 06:08

## 2025-05-16 RX ADMIN — Medication 0.5 MILLIGRAM(S): at 06:08

## 2025-05-16 RX ADMIN — Medication 40 MILLIGRAM(S): at 06:09

## 2025-05-16 RX ADMIN — Medication 0.5 MILLIGRAM(S): at 07:24

## 2025-05-16 RX ADMIN — Medication 0.5 MILLIGRAM(S): at 02:59

## 2025-05-16 NOTE — DISCHARGE NOTE PROVIDER - NSDCCPCAREPLAN_GEN_ALL_CORE_FT
PRINCIPAL DISCHARGE DIAGNOSIS  Diagnosis: Diverticulitis of intestine with perforation  Assessment and Plan of Treatment:      PRINCIPAL DISCHARGE DIAGNOSIS  Diagnosis: Diverticulitis of intestine with perforation  Assessment and Plan of Treatment: Please f/u with your surgeon in 2 weeks.   Return to the ED for any fever, chills, worsening abdominal pain, nausea or vomiting.

## 2025-05-16 NOTE — DISCHARGE NOTE NURSING/CASE MANAGEMENT/SOCIAL WORK - FINANCIAL ASSISTANCE
[facility] provides services at a reduced cost to those who are determined to be eligible through [facility]’s financial assistance program. Information regarding [facility]’s financial assistance program can be found by going to [email or web address] or by calling [phone].

## 2025-05-16 NOTE — DISCHARGE NOTE PROVIDER - NSDCMRMEDTOKEN_GEN_ALL_CORE_FT
atorvastatin 40 mg oral tablet: 1 tab(s) orally once a day  gabapentin 300 mg oral tablet: 1 tab(s) orally 3 times a day  propranolol 20 mg oral tablet: 1 tab(s) orally once a day  Protonix 40 mg oral delayed release tablet: 1 tab(s) orally once a day  sertraline 100 mg oral tablet: 2 tab(s) orally once a day   atorvastatin 40 mg oral tablet: 1 tab(s) orally once a day  cefpodoxime 200 mg oral tablet: 2 tab(s) orally 2 times a day  gabapentin 300 mg oral tablet: 1 tab(s) orally 3 times a day  metroNIDAZOLE 500 mg oral tablet: 1 tab(s) orally every 12 hours  propranolol 20 mg oral tablet: 1 tab(s) orally once a day  Protonix 40 mg oral delayed release tablet: 1 tab(s) orally once a day  sertraline 100 mg oral tablet: 2 tab(s) orally once a day

## 2025-05-16 NOTE — DISCHARGE NOTE NURSING/CASE MANAGEMENT/SOCIAL WORK - PATIENT PORTAL LINK FT
You can access the FollowMyHealth Patient Portal offered by [facility] by registering at the following website: [email or web address]. By joining [facility]’s FollowMyHealth portal, you will also be able to view your health information using other applications (apps) compatible with our system.

## 2025-05-16 NOTE — DISCHARGE NOTE PROVIDER - CARE PROVIDER_API CALL
Ford Hackett [facility]  [address], [address]  [place] [address]  Phone: [phone]  Fax: [phone]  Follow Up Time:

## 2025-05-16 NOTE — DISCHARGE NOTE PROVIDER - HOSPITAL COURSE
63 y/o male hx COPD, HLD, pre-diabetes and psoriasis, presenting with one week of abdominal pain now localized in the rlq. Pt reports surgical history of open appendectomy in the year 2000, two days later had a lap [place], 2001 incisional hernia repair with mesh. One week ago pt had LLQ pain and called his PCP who prescribed a one week course of PO antibiotics for diverticulitis. Pt reports that the pain waxed and waned over the course of the week but now worsened and localized in the RLQ. Fever earlier in the week but afebrile the last 24 hours. Seen and examined, wife at bedside. Denies chest pain, shortness of breath, fever/chills, change in bowel or bladder function. CT scan 5/13/2025 shows Suspected perforated acute appendicitis versus perforated diverticulitis of the terminal ileum. The perforation appears contained to the right lower quadrant. HD1 No acute complaints. +BM/ - flatus. denies n/v/d, fever, chills.  HD2 . No acute complaints. +BM/+flatus denies n/v/d, fever, chills. tolerating clear liquids diet. HD3  + watery bm/ +flatus. denies fevers,chills,n/v/d tolerating low fiber diet. At the time of discharge, the patient was hemodynamically stable, was tolerating PO diet, was voiding urine and passing BM/gas, was ambulating, and was comfortable with adequate pain control. The patient was instructed to follow up with Dr. Hackett within 1-2 weeks after discharge from the hospital. The patient/family felt comfortable with discharge. The patient is stable and ready for discharge to home. The patient had no other issues.           63 y/o male hx COPD, HLD, pre-diabetes and psoriasis, presenting with one week of abdominal pain now localized in the rlq. Pt reports surgical history of open appendectomy in the year 2000, two days later had a lap [place], 2001 incisional hernia repair with mesh. One week ago pt had LLQ pain and called his PCP who prescribed a one week course of PO antibiotics for diverticulitis. Pt reports that the pain waxed and waned over the course of the week but now worsened and localized in the RLQ. Fever earlier in the week but afebrile the last 24 hours. Seen and examined, wife at bedside. Denies chest pain, shortness of breath, fever/chills, change in bowel or bladder function. CT scan 5/13/2025 shows Suspected perforated acute appendicitis versus perforated diverticulitis of the terminal ileum. The perforation appears contained to the right lower quadrant. HD1 No acute complaints. +BM/ - flatus. denies n/v/d, fever, chills.  HD2 . No acute complaints. +BM/+flatus denies n/v/d, fever, chills. tolerating clear liquids diet. HD3  + watery bm/ +flatus. denies fevers, chills, n/v/d tolerating low fiber diet. At the time of discharge, the patient was hemodynamically stable, was tolerating PO diet, was voiding urine and passing BM/gas, was ambulating, and was comfortable with adequate pain control. The patient was instructed to follow up with Dr. Hackett within 1-2 weeks after discharge from the hospital. The patient/family felt comfortable with discharge. The patient is stable and ready for discharge to home. The patient had no other issues.

## 2025-05-16 NOTE — PROGRESS NOTE ADULT - ASSESSMENT
64y.o. Male with suspected perforated terminal ileum diverticulitis    - advance to clears  -abx  -serial abdominal exams  -OOB ambulate  -Pain control prn  -DVT ppx  -Incentive spirometry   discussed with attending
64y.o. Male perforated diverticulitis of the terminal ileum    -advance diet to LFD  -continue IVF, IV recephin and flagyl  - serial abdominal exams  -OOB ambulate  -Pain control prn  -DVT ppx  -Incentive spirometry   -discussed with attending
64y.o. Male with improving diverticulitis of terminal ileum  VSS, K 3.1     -dc planning for today  -PO K 40meq  -continue abx, and going home with PO augmentin  -OOB ambulate  -Pain /nausea control prn  -DVT ppx  -Incentive spirometry   -discussed with attending
64y.o. Male perforated diverticulitis of the terminal ileum    -pts pain is improving , + flatus, diet as per surgical team  -continue IVF, IV recephin and flagyl  - serial abdominal exams  -OOB ambulate  -DVT ppx  -

## 2025-05-16 NOTE — PROGRESS NOTE ADULT - SUBJECTIVE AND OBJECTIVE BOX
SUBJECTIVE / OVERNIGHT EVENTS:pt seen and examined  05-15-25     MEDICATIONS  (STANDING):  atorvastatin 40 milliGRAM(s) Oral at bedtime  cefTRIAXone   IVPB 1000 milliGRAM(s) IV Intermittent every 24 hours  gabapentin 300 milliGRAM(s) Oral three times a day  heparin   Injectable 5000 Unit(s) SubCutaneous every 8 hours  metroNIDAZOLE  IVPB 500 milliGRAM(s) IV Intermittent every 8 hours  ondansetron Injectable 4 milliGRAM(s) IV Push once  pantoprazole    Tablet 40 milliGRAM(s) Oral before breakfast  propranolol 20 milliGRAM(s) Oral daily  sertraline 200 milliGRAM(s) Oral daily    MEDICATIONS  (PRN):  acetaminophen   IVPB .. 1000 milliGRAM(s) IV Intermittent every 8 hours PRN Mild Pain (1 - 3), Moderate Pain (4 - 6)  HYDROmorphone  Injectable 0.5 milliGRAM(s) IV Push every 6 hours PRN Severe Pain (7 - 10)    T(C): 36.7 (05-15-25 @ 22:00), Max: 36.7 (05-15-25 @ 05:13)  HR: 74 (05-15-25 @ 22:00) (74 - 87)  BP: 135/70 (05-15-25 @ 22:00) (120/76 - 144/81)  RR: 18 (05-15-25 @ 22:00) (17 - 20)  SpO2: 95% (05-15-25 @ 22:00) (95% - 96%)    CAPILLARY BLOOD GLUCOSE        I&O's Summary      Constitutional: No fever, fatigue  Skin: No rash.  Eyes: No recent vision problems or eye pain.  ENT: No congestion, ear pain, or sore throat.  Cardiovascular: No chest pain or palpation.  Respiratory: No cough, shortness of breath, congestion, or wheezing.  Gastrointestinal: No abdominal pain, nausea, vomiting, or diarrhea.  Genitourinary: No dysuria.  Musculoskeletal: No joint swelling.  Neurologic: No headache.    PHYSICAL EXAM:  GENERAL: NAD  EYES: EOMI, PERRLA  NECK: Supple, No JVD  CHEST/LUNG: dec breath sounds at bases  HEART:  S1 , S2 +  ABDOMEN: soft, bs+  EXTREMITIES:  no edema  NEUROLOGY:alert awake      LABS:                        12.3   4.88  )-----------( 196      ( 15 May 2025 06:25 )             36.5     05-15    138  |  105  |  5[L]  ----------------------------<  122[H]  3.8   |  26  |  0.68    Ca    8.7      15 May 2025 06:25            Urinalysis Basic - ( 15 May 2025 06:25 )    Color: x / Appearance: x / SG: x / pH: x  Gluc: 122 mg/dL / Ketone: x  / Bili: x / Urobili: x   Blood: x / Protein: x / Nitrite: x   Leuk Esterase: x / RBC: x / WBC x   Sq Epi: x / Non Sq Epi: x / Bacteria: x        RADIOLOGY & ADDITIONAL TESTS:    Imaging Personally Reviewed:    Consultant(s) Notes Reviewed:      Care Discussed with Consultants/Other Providers:  
INTERVAL HPI/OVERNIGHT EVENTS:  Pt resting comfortably. No acute complaints. + waterybm/ +flatus. denies fevers,chills,n/v/d    Tolerating diet.   Diet, Low Fiber (05-15-25 @ 09:19) [Active]      MEDICATIONS  (STANDING):  atorvastatin 40 milliGRAM(s) Oral at bedtime  cefTRIAXone   IVPB 1000 milliGRAM(s) IV Intermittent every 24 hours  gabapentin 300 milliGRAM(s) Oral three times a day  heparin   Injectable 5000 Unit(s) SubCutaneous every 8 hours  metroNIDAZOLE  IVPB 500 milliGRAM(s) IV Intermittent every 8 hours  ondansetron Injectable 4 milliGRAM(s) IV Push once  pantoprazole    Tablet 40 milliGRAM(s) Oral before breakfast  propranolol 20 milliGRAM(s) Oral daily  sertraline 200 milliGRAM(s) Oral daily    MEDICATIONS  (PRN):  acetaminophen   IVPB .. 1000 milliGRAM(s) IV Intermittent every 8 hours PRN Mild Pain (1 - 3), Moderate Pain (4 - 6)  HYDROmorphone  Injectable 0.5 milliGRAM(s) IV Push every 6 hours PRN Severe Pain (7 - 10)      Vital Signs Last 24 Hrs  T(C): 37 (16 May 2025 05:49), Max: 37 (16 May 2025 05:49)  T(F): 98.6 (16 May 2025 05:49), Max: 98.6 (16 May 2025 05:49)  HR: 81 (16 May 2025 05:49) (74 - 81)  BP: 137/77 (16 May 2025 05:49) (120/76 - 137/77)  BP(mean): 92 (15 May 2025 22:00) (92 - 92)  RR: 18 (16 May 2025 05:49) (18 - 20)  SpO2: 94% (16 May 2025 05:49) (94% - 95%)    Parameters below as of 16 May 2025 05:49  Patient On (Oxygen Delivery Method): room air        Physical exam  CONSTITUTIONAL: Well groomed, no apparent distress  RESP: No respiratory distress, no use of accessory muscles  GI: Soft, TTP of lower abdomen, ND, no rebound, no guarding; no palpable masses      I&O's Detail      LABS:                        12.3   5.02  )-----------( 190      ( 16 May 2025 06:28 )             34.5             05-16    138  |  105  |  9   ----------------------------<  130[H]  3.1[L]   |  26  |  0.57    Ca    8.5      16 May 2025 06:28        
INTERVAL HPI/OVERNIGHT EVENTS:  Pt resting comfortably. No acute complaints. +BM/ - flatus. denies n/v/d, fever,chills     Diet, NPO:   Except Medications (05-13-25 @ 11:33) [Active]    MEDICATIONS  (STANDING):  atorvastatin 40 milliGRAM(s) Oral at bedtime  cefTRIAXone   IVPB 1000 milliGRAM(s) IV Intermittent every 24 hours  chlorhexidine 2% Cloths 1 Application(s) Topical daily  gabapentin 300 milliGRAM(s) Oral three times a day  heparin   Injectable 5000 Unit(s) SubCutaneous every 8 hours  lactated ringers. 1000 milliLiter(s) (115 mL/Hr) IV Continuous <Continuous>  metroNIDAZOLE  IVPB 500 milliGRAM(s) IV Intermittent every 8 hours  ondansetron Injectable 4 milliGRAM(s) IV Push once  pantoprazole    Tablet 40 milliGRAM(s) Oral before breakfast  propranolol 20 milliGRAM(s) Oral daily  sertraline 200 milliGRAM(s) Oral daily    MEDICATIONS  (PRN):    Vital Signs Last 24 Hrs  T(C): 36.8 (14 May 2025 05:10), Max: 36.9 (13 May 2025 11:12)  T(F): 98.2 (14 May 2025 05:10), Max: 98.4 (13 May 2025 11:12)  HR: 90 (14 May 2025 05:10) (77 - 99)  BP: 135/74 (14 May 2025 05:10) (121/82 - 147/79)  BP(mean): 94 (14 May 2025 05:10) (87 - 94)  RR: 17 (14 May 2025 05:10) (17 - 19)  SpO2: 95% (14 May 2025 05:10) (95% - 100%)    Parameters below as of 14 May 2025 05:10  Patient On (Oxygen Delivery Method): room air    Physical exam  CONSTITUTIONAL: Well groomed, no apparent distress  RESP: No respiratory distress, no use of accessory muscles  GI: Soft, NT, ND, no rebound, no guarding; no palpable masses      I&O's Detail    13 May 2025 07:01  -  14 May 2025 07:00  --------------------------------------------------------  IN:    Lactated Ringers: 1265 mL  Total IN: 1265 mL    OUT:  Total OUT: 0 mL    Total NET: 1265 mL          LABS:                        14.3   10.94 )-----------( 220      ( 13 May 2025 07:33 )             40.9             05-13    134[L]  |  105  |  15  ----------------------------<  141[H]  3.6   |  24  |  0.79    Ca    9.1      13 May 2025 07:33    TPro  8.0  /  Alb  4.0  /  TBili  0.9  /  DBili  x   /  AST  12  /  ALT  17  /  AlkPhos  63  05-13      
INTERVAL HPI/OVERNIGHT EVENTS:  Pt resting comfortably. No acute complaints. +BM/+flatus denies n/v/d, fever, chills  Tolerating diet.   Diet, Clear Liquid (05-14-25 @ 09:57) [Active]        MEDICATIONS  (STANDING):  atorvastatin 40 milliGRAM(s) Oral at bedtime  cefTRIAXone   IVPB 1000 milliGRAM(s) IV Intermittent every 24 hours  gabapentin 300 milliGRAM(s) Oral three times a day  heparin   Injectable 5000 Unit(s) SubCutaneous every 8 hours  lactated ringers. 1000 milliLiter(s) (115 mL/Hr) IV Continuous <Continuous>  metroNIDAZOLE  IVPB 500 milliGRAM(s) IV Intermittent every 8 hours  ondansetron Injectable 4 milliGRAM(s) IV Push once  pantoprazole    Tablet 40 milliGRAM(s) Oral before breakfast  propranolol 20 milliGRAM(s) Oral daily  sertraline 200 milliGRAM(s) Oral daily    MEDICATIONS  (PRN):  acetaminophen   IVPB .. 1000 milliGRAM(s) IV Intermittent every 8 hours PRN Mild Pain (1 - 3), Moderate Pain (4 - 6)  HYDROmorphone  Injectable 0.5 milliGRAM(s) IV Push every 6 hours PRN Severe Pain (7 - 10)      Vital Signs Last 24 Hrs  T(C): 36.7 (15 May 2025 05:13), Max: 36.7 (15 May 2025 05:13)  T(F): 98.1 (15 May 2025 05:13), Max: 98.1 (15 May 2025 05:13)  HR: 87 (15 May 2025 05:13) (73 - 87)  BP: 144/81 (15 May 2025 05:13) (97/61 - 144/81)  BP(mean): --  RR: 17 (15 May 2025 05:13) (16 - 18)  SpO2: 96% (15 May 2025 05:13) (94% - 96%)    Parameters below as of 15 May 2025 05:13  Patient On (Oxygen Delivery Method): room air    Physical exam  CONSTITUTIONAL: Well groomed, no apparent distress  RESP: No respiratory distress, no use of accessory muscles  GI: Soft, NT, ND, no rebound, no guarding; no palpable masses        I&O's Detail      LABS:                        12.3   4.88  )-----------( 196      ( 15 May 2025 06:25 )             36.5             05-15    138  |  105  |  5[L]  ----------------------------<  122[H]  3.8   |  26  |  0.68    Ca    8.7      15 May 2025 06:25

## 2025-05-16 NOTE — DISCHARGE NOTE PROVIDER - REASON FOR ADMISSION
Patient Instructions by Amparo Buico MD at 04/25/17 05:13 PM     Author:  Amparo Bucio MD Service:  (none) Author Type:  Physician     Filed:  04/25/17 05:13 PM Encounter Date:  4/25/2017 Status:  Signed     :  Amparo Bucio MD (Physician)            Have labs done today    Schedule ultrasound tomorrow    Keep legs elevated    Take water pill  Limit salt.     Manage stress    PATIENT INFORMATION   -- Please go to the lab now to have your labwork completed. Your doctor’s office will notify you of your results within 10 working days.  -- Please call [phone] to schedule your Ultrasound    Follow-Up  -- Make an appointment with Amparo Bucio MD in two weeks    Additional Educational Resources:  For additional resources regarding your symptoms, diagnosis, or further health information, please visit the Health Resources section on [email or web address] or the Online Health Resources section in alike.            Revision History        User Key Date/Time User Provider Type Action    > [N/A] 04/25/17 05:13 PM Amparo Bucio MD Physician Sign            
divertulitis

## 2025-05-27 ENCOUNTER — NON-APPOINTMENT (OUTPATIENT)
Age: 65
End: 2025-05-27

## 2025-05-29 ENCOUNTER — APPOINTMENT (OUTPATIENT)
Dept: SURGERY | Facility: CLINIC | Age: 65
End: 2025-05-29
Payer: COMMERCIAL

## 2025-05-29 ENCOUNTER — RESULT REVIEW (OUTPATIENT)
Age: 65
End: 2025-05-29

## 2025-05-29 VITALS
BODY MASS INDEX: 23.03 KG/M2 | WEIGHT: 170 LBS | SYSTOLIC BLOOD PRESSURE: 120 MMHG | HEART RATE: 86 BPM | OXYGEN SATURATION: 95 % | HEIGHT: 72 IN | DIASTOLIC BLOOD PRESSURE: 79 MMHG

## 2025-05-29 DIAGNOSIS — F10.21 ALCOHOL DEPENDENCE, IN REMISSION: ICD-10-CM

## 2025-05-29 DIAGNOSIS — K57.30 DIVERTICULOSIS OF LARGE INTESTINE W/OUT PERFORATION OR ABSCESS W/OUT BLEEDING: ICD-10-CM

## 2025-05-29 DIAGNOSIS — Z78.9 OTHER SPECIFIED HEALTH STATUS: ICD-10-CM

## 2025-05-29 DIAGNOSIS — Z80.1 FAMILY HISTORY OF MALIGNANT NEOPLASM OF TRACHEA, BRONCHUS AND LUNG: ICD-10-CM

## 2025-05-29 DIAGNOSIS — K46.9 UNSPECIFIED ABDOMINAL HERNIA W/OUT OBSTRUCTION OR GANGRENE: ICD-10-CM

## 2025-05-29 DIAGNOSIS — K57.32 DIVERTICULITIS OF LARGE INTESTINE W/OUT PERFORATION OR ABSCESS W/OUT BLEEDING: ICD-10-CM

## 2025-05-29 DIAGNOSIS — R45.0 NERVOUSNESS: ICD-10-CM

## 2025-05-29 DIAGNOSIS — R63.0 ANOREXIA: ICD-10-CM

## 2025-05-29 DIAGNOSIS — Z80.0 FAMILY HISTORY OF MALIGNANT NEOPLASM OF DIGESTIVE ORGANS: ICD-10-CM

## 2025-05-29 PROCEDURE — 99214 OFFICE O/P EST MOD 30 MIN: CPT

## 2025-06-01 PROBLEM — K46.9 HERNIA: Status: RESOLVED | Noted: 2019-08-12 | Resolved: 2025-06-01

## 2025-06-01 PROBLEM — R45.0 NERVOUSNESS: Status: RESOLVED | Noted: 2019-08-12 | Resolved: 2025-06-01

## 2025-06-01 PROBLEM — R63.0 POOR APPETITE: Status: RESOLVED | Noted: 2019-08-12 | Resolved: 2025-06-01

## 2025-06-01 PROBLEM — F10.21 HISTORY OF ALCOHOLISM: Status: RESOLVED | Noted: 2019-08-12 | Resolved: 2025-06-01

## 2025-06-03 ENCOUNTER — APPOINTMENT (OUTPATIENT)
Dept: CT IMAGING | Facility: CLINIC | Age: 65
End: 2025-06-03
Payer: COMMERCIAL

## 2025-06-03 PROCEDURE — 74177 CT ABD & PELVIS W/CONTRAST: CPT

## 2025-06-09 ENCOUNTER — APPOINTMENT (OUTPATIENT)
Dept: SURGERY | Facility: CLINIC | Age: 65
End: 2025-06-09
Payer: COMMERCIAL

## 2025-06-09 VITALS
HEART RATE: 87 BPM | SYSTOLIC BLOOD PRESSURE: 114 MMHG | DIASTOLIC BLOOD PRESSURE: 76 MMHG | OXYGEN SATURATION: 96 % | HEIGHT: 72 IN | WEIGHT: 170 LBS | BODY MASS INDEX: 23.03 KG/M2

## 2025-06-09 PROCEDURE — 99215 OFFICE O/P EST HI 40 MIN: CPT

## 2025-06-09 RX ORDER — METRONIDAZOLE 500 MG/1
500 TABLET ORAL 3 TIMES DAILY
Qty: 42 | Refills: 0 | Status: ACTIVE | COMMUNITY
Start: 2025-06-09 | End: 1900-01-01

## 2025-06-09 RX ORDER — CIPROFLOXACIN HYDROCHLORIDE 500 MG/1
500 TABLET, FILM COATED ORAL
Qty: 28 | Refills: 0 | Status: ACTIVE | COMMUNITY
Start: 2025-06-09 | End: 1900-01-01

## 2025-06-12 PROBLEM — K46.9 HERNIA: Status: RESOLVED | Noted: 2019-08-12 | Resolved: 2025-06-12

## 2025-06-12 PROBLEM — R63.0 POOR APPETITE: Status: RESOLVED | Noted: 2019-08-12 | Resolved: 2025-06-12

## 2025-06-12 PROBLEM — R45.0 NERVOUSNESS: Status: RESOLVED | Noted: 2019-08-12 | Resolved: 2025-06-12

## 2025-06-12 PROBLEM — F10.21 HISTORY OF ALCOHOLISM: Status: RESOLVED | Noted: 2019-08-12 | Resolved: 2025-06-12

## 2025-08-27 ENCOUNTER — APPOINTMENT (OUTPATIENT)
Dept: CT IMAGING | Facility: CLINIC | Age: 65
End: 2025-08-27

## 2025-08-27 PROCEDURE — 74177 CT ABD & PELVIS W/CONTRAST: CPT

## 2025-09-11 ENCOUNTER — APPOINTMENT (OUTPATIENT)
Dept: SURGERY | Facility: CLINIC | Age: 65
End: 2025-09-11
Payer: COMMERCIAL

## 2025-09-11 VITALS
HEART RATE: 89 BPM | OXYGEN SATURATION: 93 % | DIASTOLIC BLOOD PRESSURE: 80 MMHG | SYSTOLIC BLOOD PRESSURE: 128 MMHG | WEIGHT: 180 LBS | HEIGHT: 72 IN | BODY MASS INDEX: 24.38 KG/M2

## 2025-09-11 DIAGNOSIS — Z80.0 FAMILY HISTORY OF MALIGNANT NEOPLASM OF DIGESTIVE ORGANS: ICD-10-CM

## 2025-09-11 DIAGNOSIS — R63.0 ANOREXIA: ICD-10-CM

## 2025-09-11 DIAGNOSIS — F10.21 ALCOHOL DEPENDENCE, IN REMISSION: ICD-10-CM

## 2025-09-11 DIAGNOSIS — R45.0 NERVOUSNESS: ICD-10-CM

## 2025-09-11 DIAGNOSIS — Z78.9 OTHER SPECIFIED HEALTH STATUS: ICD-10-CM

## 2025-09-11 DIAGNOSIS — K46.9 UNSPECIFIED ABDOMINAL HERNIA W/OUT OBSTRUCTION OR GANGRENE: ICD-10-CM

## 2025-09-11 DIAGNOSIS — Z80.1 FAMILY HISTORY OF MALIGNANT NEOPLASM OF TRACHEA, BRONCHUS AND LUNG: ICD-10-CM

## 2025-09-11 DIAGNOSIS — K57.32 DIVERTICULITIS OF LARGE INTESTINE W/OUT PERFORATION OR ABSCESS W/OUT BLEEDING: ICD-10-CM

## 2025-09-11 DIAGNOSIS — K57.30 DIVERTICULOSIS OF LARGE INTESTINE W/OUT PERFORATION OR ABSCESS W/OUT BLEEDING: ICD-10-CM

## 2025-09-11 DIAGNOSIS — K57.92 DIVERTICULITIS OF INTESTINE, PART UNSPECIFIED, W/OUT PERFORATION OR ABSCESS W/OUT BLEEDING: ICD-10-CM

## 2025-09-11 PROCEDURE — 99215 OFFICE O/P EST HI 40 MIN: CPT

## 2025-09-11 RX ORDER — NEOMYCIN SULFATE 500 MG/1
500 TABLET ORAL
Qty: 6 | Refills: 0 | Status: ACTIVE | COMMUNITY
Start: 2025-09-11 | End: 1900-01-01

## 2025-09-11 RX ORDER — POLYETHYLENE GLYCOL 3350 AND ELECTROLYTES WITH LEMON FLAVOR 236; 22.74; 6.74; 5.86; 2.97 G/4L; G/4L; G/4L; G/4L; G/4L
236 POWDER, FOR SOLUTION ORAL
Qty: 1 | Refills: 0 | Status: ACTIVE | COMMUNITY
Start: 2025-09-11 | End: 1900-01-01

## 2025-09-11 RX ORDER — METRONIDAZOLE 500 MG/1
500 TABLET ORAL
Qty: 3 | Refills: 0 | Status: ACTIVE | COMMUNITY
Start: 2025-09-11 | End: 1900-01-01

## 2025-09-15 ENCOUNTER — NON-APPOINTMENT (OUTPATIENT)
Age: 65
End: 2025-09-15

## 2025-09-16 ENCOUNTER — LABORATORY RESULT (OUTPATIENT)
Age: 65
End: 2025-09-16

## 2025-09-16 ENCOUNTER — NON-APPOINTMENT (OUTPATIENT)
Age: 65
End: 2025-09-16

## 2025-09-22 PROBLEM — Z87.19 PERSONAL HISTORY OF OTHER DISEASES OF THE DIGESTIVE SYSTEM: Chronic | Status: ACTIVE | Noted: 2025-09-15

## 2025-09-22 PROBLEM — K37 UNSPECIFIED APPENDICITIS: Chronic | Status: ACTIVE | Noted: 2025-09-15

## 2025-09-22 PROBLEM — A04.72 C. DIFFICILE DIARRHEA: Status: ACTIVE | Noted: 2025-09-22

## 2025-09-22 PROBLEM — K57.90 DIVERTICULOSIS OF INTESTINE, PART UNSPECIFIED, WITHOUT PERFORATION OR ABSCESS WITHOUT BLEEDING: Chronic | Status: ACTIVE | Noted: 2025-09-15

## 2025-09-22 PROBLEM — F10.11 ALCOHOL ABUSE, IN REMISSION: Chronic | Status: ACTIVE | Noted: 2025-09-15

## 2025-09-22 PROBLEM — N50.9 DISORDER OF MALE GENITAL ORGANS, UNSPECIFIED: Chronic | Status: ACTIVE | Noted: 2025-09-15

## 2025-09-22 PROBLEM — F19.10 OTHER PSYCHOACTIVE SUBSTANCE ABUSE, UNCOMPLICATED: Chronic | Status: ACTIVE | Noted: 2025-09-15

## 2025-09-22 PROBLEM — K43.2 INCISIONAL HERNIA WITHOUT OBSTRUCTION OR GANGRENE: Chronic | Status: ACTIVE | Noted: 2025-09-15

## 2025-09-22 LAB
C DIFF TOXIN B QL PCR REFLEX: NORMAL
GDH ANTIGEN: DETECTED
TOXIN A AND B: NOT DETECTED